# Patient Record
Sex: FEMALE | Race: WHITE | NOT HISPANIC OR LATINO | Employment: OTHER | URBAN - METROPOLITAN AREA
[De-identification: names, ages, dates, MRNs, and addresses within clinical notes are randomized per-mention and may not be internally consistent; named-entity substitution may affect disease eponyms.]

---

## 2017-03-27 ENCOUNTER — ALLSCRIPTS OFFICE VISIT (OUTPATIENT)
Dept: OTHER | Facility: OTHER | Age: 62
End: 2017-03-27

## 2017-05-02 ENCOUNTER — CONVERSION ENCOUNTER (OUTPATIENT)
Dept: MAMMOGRAPHY | Facility: CLINIC | Age: 62
End: 2017-05-02

## 2017-05-02 ENCOUNTER — GENERIC CONVERSION - ENCOUNTER (OUTPATIENT)
Dept: OTHER | Facility: OTHER | Age: 62
End: 2017-05-02

## 2017-09-07 ENCOUNTER — ALLSCRIPTS OFFICE VISIT (OUTPATIENT)
Dept: OTHER | Facility: OTHER | Age: 62
End: 2017-09-07

## 2017-10-20 ENCOUNTER — GENERIC CONVERSION - ENCOUNTER (OUTPATIENT)
Dept: OTHER | Facility: OTHER | Age: 62
End: 2017-10-20

## 2018-01-14 VITALS
SYSTOLIC BLOOD PRESSURE: 134 MMHG | HEIGHT: 65 IN | HEART RATE: 80 BPM | BODY MASS INDEX: 23.82 KG/M2 | WEIGHT: 143 LBS | DIASTOLIC BLOOD PRESSURE: 88 MMHG | TEMPERATURE: 97.7 F | RESPIRATION RATE: 16 BRPM

## 2018-01-14 NOTE — MISCELLANEOUS
Message  called and lef t message for pt  to call the office or my cell # [phone] referable to 08/02/16 screening mammogram at [facility] [place] [facility] (will need additional studies, but report did not mention which studies to be done)      Signatures   Electronically signed by : Mag Givens DO; Aug  3 2016  9:56AM EST                       (Author)

## 2018-01-14 NOTE — MISCELLANEOUS
Message  pt  returned my call and I explained the call; she will contact [facility] imaging to let us know what f/u Rxs needed and I advisedher to co tact our office and ask for Danilo Ayala and we can send or fax Rxs      Signatures   Electronically signed by : Elvia Last DO; Aug  3 2016 12:15PM EST                       (Author)

## 2018-03-07 DIAGNOSIS — E03.9 HYPOTHYROIDISM, UNSPECIFIED TYPE: Primary | ICD-10-CM

## 2018-03-07 RX ORDER — LEVOTHYROXINE SODIUM 50 MCG
TABLET ORAL
Qty: 90 TABLET | Refills: 2 | Status: SHIPPED | OUTPATIENT
Start: 2018-03-07 | End: 2018-12-03 | Stop reason: SDUPTHER

## 2018-03-12 DIAGNOSIS — M06.9 RHEUMATOID ARTHRITIS, INVOLVING UNSPECIFIED SITE, UNSPECIFIED RHEUMATOID FACTOR PRESENCE: Primary | ICD-10-CM

## 2018-03-12 RX ORDER — CYCLOBENZAPRINE HCL 10 MG
TABLET ORAL
Qty: 90 TABLET | Refills: 1 | Status: SHIPPED | OUTPATIENT
Start: 2018-03-12 | End: 2018-09-18 | Stop reason: SDUPTHER

## 2018-03-26 ENCOUNTER — OFFICE VISIT (OUTPATIENT)
Dept: FAMILY MEDICINE CLINIC | Facility: CLINIC | Age: 63
End: 2018-03-26
Payer: COMMERCIAL

## 2018-03-26 VITALS
SYSTOLIC BLOOD PRESSURE: 130 MMHG | HEART RATE: 80 BPM | WEIGHT: 140 LBS | TEMPERATURE: 97.6 F | HEIGHT: 64 IN | RESPIRATION RATE: 16 BRPM | DIASTOLIC BLOOD PRESSURE: 80 MMHG | BODY MASS INDEX: 23.9 KG/M2

## 2018-03-26 DIAGNOSIS — J30.9 ALLERGIC RHINITIS, UNSPECIFIED CHRONICITY, UNSPECIFIED SEASONALITY, UNSPECIFIED TRIGGER: ICD-10-CM

## 2018-03-26 DIAGNOSIS — E55.9 VITAMIN D DEFICIENCY: ICD-10-CM

## 2018-03-26 DIAGNOSIS — M06.9 RHEUMATOID ARTHRITIS INVOLVING MULTIPLE SITES, UNSPECIFIED RHEUMATOID FACTOR PRESENCE: ICD-10-CM

## 2018-03-26 DIAGNOSIS — E03.9 HYPOTHYROIDISM, UNSPECIFIED TYPE: Primary | ICD-10-CM

## 2018-03-26 PROCEDURE — 99214 OFFICE O/P EST MOD 30 MIN: CPT | Performed by: FAMILY MEDICINE

## 2018-03-26 RX ORDER — CYCLOBENZAPRINE HCL 10 MG
1 TABLET ORAL
COMMUNITY
Start: 2012-11-19 | End: 2018-05-16

## 2018-03-26 RX ORDER — MULTIVIT WITH MINERALS/LUTEIN
1000 TABLET ORAL DAILY
COMMUNITY

## 2018-03-26 RX ORDER — METHOTREXATE 25 MG/ML
INJECTION, SOLUTION INTRA-ARTERIAL; INTRAMUSCULAR; INTRAVENOUS
COMMUNITY
Start: 2010-12-24

## 2018-03-26 RX ORDER — COVID-19 ANTIGEN TEST
KIT MISCELLANEOUS AS NEEDED
COMMUNITY
End: 2022-07-05

## 2018-03-26 RX ORDER — CYCLOSPORINE 0.5 MG/ML
1 EMULSION OPHTHALMIC
COMMUNITY

## 2018-03-26 RX ORDER — HYDROCHLOROTHIAZIDE 25 MG/1
1 TABLET ORAL DAILY PRN
COMMUNITY
Start: 2016-08-23

## 2018-03-26 RX ORDER — FOLIC ACID 1 MG/1
1 TABLET ORAL DAILY
COMMUNITY
Start: 2013-06-29

## 2018-03-26 RX ORDER — OXYCODONE HYDROCHLORIDE AND ACETAMINOPHEN 5; 325 MG/1; MG/1
1 TABLET ORAL EVERY 4 HOURS PRN
COMMUNITY
Start: 2012-03-08

## 2018-03-26 RX ORDER — LEVOTHYROXINE SODIUM 0.05 MG/1
1 TABLET ORAL DAILY
COMMUNITY
Start: 2012-10-16 | End: 2018-05-16

## 2018-03-26 RX ORDER — GREEN TEA/HOODIA GORDONII 315-12.5MG
1 CAPSULE ORAL DAILY
COMMUNITY

## 2018-03-26 NOTE — PATIENT INSTRUCTIONS
Continue present treatment  Try Flonase nasal spray 2 sprays per nostril daily and increase fluids  Low-fat diet and regular exercise walking as tolerated  Follow up with specialists as scheduled and return to the office in 1 year

## 2018-03-26 NOTE — PROGRESS NOTES
Assessment/Plan:  Labs drawn for lipid profile, TSH and vitamin-D  Patient to continue present treatment and recommend she try Flonase 2 sprays each nostril daily and increase fluids  Follow a low-fat diet and get regular exercise walking as tolerated  Follow up with specialists as scheduled and return to the office in 1 year  No problem-specific Assessment & Plan notes found for this encounter  Diagnoses and all orders for this visit:    Hypothyroidism, unspecified type  Comments:  Clinically stable  Labs drawn for TSH  Continue present treatment  Orders:  -     Lipid panel  -     TSH, 3rd generation with T4 reflex    Vitamin D deficiency  Comments:  Continue vitamin-D supplement  Orders:  -     Vitamin D 25 hydroxy    Allergic rhinitis, unspecified chronicity, unspecified seasonality, unspecified trigger  Comments:  Trial Flonase 2 sprays each nostril daily  Rheumatoid arthritis involving multiple sites, unspecified rheumatoid factor presence (HCC)  Comments:  Stable  Continue present treatment and follow up with Rheumatology as scheduled  Other orders  -     Naproxen Sodium (ALEVE) 220 MG CAPS; Take by mouth  -     oxyCODONE-acetaminophen (PERCOCET) 5-325 mg per tablet; Take by mouth  -     hydrochlorothiazide (HYDRODIURIL) 25 mg tablet; Take 1 tablet by mouth daily as needed  -     folic acid (FOLVITE) 1 mg tablet; Take 1 tablet by mouth daily  -     cyclobenzaprine (FLEXERIL) 10 mg tablet; Take 1 tablet by mouth  -     cycloSPORINE (RESTASIS) 0 05 % ophthalmic emulsion; Apply 1 drop to eye  -     levothyroxine (SYNTHROID) 50 mcg tablet; Take 1 tablet by mouth daily  -     Ascorbic Acid (VITAMIN C) 1000 MG tablet; Take 1,000 mg by mouth daily  -     Cholecalciferol 2000 units CAPS; Take 1 capsule by mouth  -     Multiple Vitamins-Minerals (HAIR/SKIN/NAILS/BIOTIN) TABS; Take 1 tablet by mouth daily  -     methotrexate 25 MG/ML;  Inject as directed          Subjective:      Patient ID: Adrianna Suarezjeremy Alaina Barraza is a 60 y o  female  Patient is here for routine appointment for chronic conditions and fasting labs  Patient has been feeling fairly well overall  Patient was recently in [place] and a lot of walking  Patient saw her rheumatologist last week and was treated with a Z-Bang for sinus infection  Patient had labs for CBC, CMP, CRP and sed rate 2 weeks ago per Rheumatology  Thyroid Problem   Presents for follow-up visit  Symptoms include cold intolerance, dry skin, leg swelling and nail problem  Patient reports no anxiety, constipation, depressed mood, diaphoresis, diarrhea, fatigue, hair loss, heat intolerance, hoarse voice, palpitations, tremors, visual change, weight gain or weight loss  The symptoms have been stable  The following portions of the patient's history were reviewed and updated as appropriate: allergies, current medications, past family history, past medical history, past social history, past surgical history and problem list     Review of Systems   Constitutional: Negative for activity change, appetite change, diaphoresis, fatigue, unexpected weight change, weight gain and weight loss  HENT: Positive for congestion, ear pain, postnasal drip and sinus pressure  Negative for ear discharge, hearing loss, hoarse voice, nosebleeds, sinus pain, sneezing, sore throat and tinnitus  Eyes: Negative  Respiratory: Negative for cough, chest tightness, shortness of breath and wheezing  Cardiovascular: Positive for leg swelling  Negative for chest pain and palpitations  Gastrointestinal: Negative for abdominal pain, blood in stool, constipation, diarrhea and nausea  Endocrine: Positive for cold intolerance  Negative for heat intolerance  Genitourinary: Negative for difficulty urinating, dysuria, frequency, hematuria and urgency  Musculoskeletal: Positive for arthralgias, back pain, joint swelling, myalgias, neck pain and neck stiffness  Negative for gait problem     Skin: Negative  Neurological: Negative for dizziness, tremors, syncope, light-headedness and headaches  Hematological: Negative for adenopathy  Does not bruise/bleed easily  Psychiatric/Behavioral: The patient is not nervous/anxious  Objective:      /80   Pulse 80   Temp 97 6 °F (36 4 °C) (Tympanic)   Resp 16   Ht 5' 4" (1 626 m)   Wt 63 5 kg (140 lb)   BMI 24 03 kg/m²          Physical Exam   Constitutional: She is oriented to person, place, and time  She appears well-developed and well-nourished  HENT:   Head: Normocephalic  Right Ear: External ear normal    Left Ear: External ear normal    Mouth/Throat: Oropharynx is clear and moist    Positive turbinates swelling with mucoid drainage  Eyes: Conjunctivae are normal  No scleral icterus  Neck: Neck supple  No thyromegaly present  Cardiovascular: Normal rate and regular rhythm  Pulmonary/Chest: Effort normal and breath sounds normal    Abdominal: Soft  There is no tenderness  Musculoskeletal: She exhibits no edema  Lymphadenopathy:     She has no cervical adenopathy  Neurological: She is alert and oriented to person, place, and time  Skin: Skin is warm and dry  Psychiatric: She has a normal mood and affect

## 2018-05-16 ENCOUNTER — OFFICE VISIT (OUTPATIENT)
Dept: FAMILY MEDICINE CLINIC | Facility: CLINIC | Age: 63
End: 2018-05-16
Payer: COMMERCIAL

## 2018-05-16 VITALS
HEART RATE: 84 BPM | DIASTOLIC BLOOD PRESSURE: 78 MMHG | TEMPERATURE: 98.2 F | WEIGHT: 139 LBS | SYSTOLIC BLOOD PRESSURE: 130 MMHG | HEIGHT: 64 IN | RESPIRATION RATE: 16 BRPM | BODY MASS INDEX: 23.73 KG/M2

## 2018-05-16 DIAGNOSIS — G56.01 CARPAL TUNNEL SYNDROME OF RIGHT WRIST: Primary | ICD-10-CM

## 2018-05-16 PROCEDURE — 99213 OFFICE O/P EST LOW 20 MIN: CPT | Performed by: FAMILY MEDICINE

## 2018-05-16 PROCEDURE — 3008F BODY MASS INDEX DOCD: CPT | Performed by: FAMILY MEDICINE

## 2018-05-16 RX ORDER — FLUTICASONE PROPIONATE 50 MCG
1 SPRAY, SUSPENSION (ML) NASAL AS NEEDED
COMMUNITY
End: 2020-05-29

## 2018-05-16 RX ORDER — METHYLPREDNISOLONE 4 MG/1
TABLET ORAL
Qty: 21 TABLET | Refills: 0 | Status: SHIPPED | OUTPATIENT
Start: 2018-05-16 | End: 2018-11-26 | Stop reason: ALTCHOICE

## 2018-05-16 RX ORDER — SODIUM SULFACETAMIDE AND SULFUR 80; 40 MG/ML; MG/ML
LOTION TOPICAL
Refills: 3 | COMMUNITY
Start: 2018-04-05 | End: 2020-06-12

## 2018-05-16 NOTE — PROGRESS NOTES
Assessment/Plan:    Discussed diagnostic and treatment options with patient  Discussed EMG testing although will hold off at this time as symptoms of bone been present for few weeks  Patient will be started on a Medrol Dosepak and instructed to hold Aleve while taking Medrol Dosepak  Patient instructed to wear right cock-up wrist splint q h s  and p r n  during the day  Patient may apply ice p r n  Recommend rest and avoid overuse  Return to the office in 2-3 weeks or sooner p r n   If symptoms persist discussed EMG testing and referral to orthopedic hand specialist      Diagnoses and all orders for this visit:    Carpal tunnel syndrome of right wrist  Comments:  Medrol Dosepak  Right cock-up wrist splint q h s  and p r n  during the day  Rest and ice p r n   Orders:  -     Methylprednisolone 4 MG TBPK; Use as directed on package    Other orders  -     SULFACLEANSE 8/4 8-4 % SUSP; [address] FACE WITH CLEANSER DAILY  -     fluticasone (FLONASE) 50 mcg/act nasal spray; 1 spray into each nostril daily          Subjective:      Patient ID: Bogdan Coles is a 60 y o  female  Patient complains of right hand numbness and tingling involving her thumb index and middle finger for the past 2-3 weeks intermittently  She admits to slight pain in the right hand and symptoms awakening her at night from sleep  She admits to some weakness and occasionally dropping things  She also admits to occasional numbness and tingling of her right 4th and 5th fingers while driving in her car resting her right arm on a console  Patient denies any specific injury or fall  She does admit to arthritis in her neck  Patient admits to doing a lot of painting over the past few weeks prior to the onset of her symptoms  She has treated this with Aleve without significant relief  Hand Pain    The incident occurred more than 1 week ago  The injury mechanism was repetitive motion  The pain is present in the right hand   The quality of the pain is described as burning  The pain does not radiate  The pain is mild  Associated symptoms include muscle weakness, numbness and tingling  She has tried NSAIDs for the symptoms  The treatment provided mild relief  The following portions of the patient's history were reviewed and updated as appropriate: allergies, current medications, past family history, past medical history, past social history, past surgical history and problem list     Review of Systems   Neurological: Positive for tingling and numbness  Objective:      /78   Pulse 84   Temp 98 2 °F (36 8 °C) (Oral)   Resp 16   Ht 5' 4" (1 626 m)   Wt 63 kg (139 lb)   BMI 23 86 kg/m²          Physical Exam   Constitutional: She is oriented to person, place, and time  She appears well-developed and well-nourished  HENT:   Head: Normocephalic  Mouth/Throat: Oropharynx is clear and moist    Eyes: Conjunctivae are normal    Neck: Normal range of motion  Neck supple  Cardiovascular: Normal rate and regular rhythm  Pulmonary/Chest: Effort normal and breath sounds normal    Abdominal: Soft  There is no tenderness  Musculoskeletal: She exhibits tenderness  She exhibits no edema  Neck range of motion intact with mild tenderness  Right shoulder and right elbow full range of motion intact and nontender  Right wrist range of motion intact with mild tenderness  Positive Tinel's and Phalen's at the right wrist   Negative thenar atrophy and  strength intact  Mild Tinel's at the right elbow  Lymphadenopathy:     She has no cervical adenopathy  Neurological: She is alert and oriented to person, place, and time  Skin: Skin is warm and dry  Psychiatric: She has a normal mood and affect

## 2018-05-18 ENCOUNTER — ANNUAL EXAM (OUTPATIENT)
Dept: OBGYN CLINIC | Facility: MEDICAL CENTER | Age: 63
End: 2018-05-18
Payer: COMMERCIAL

## 2018-05-18 VITALS
SYSTOLIC BLOOD PRESSURE: 150 MMHG | HEIGHT: 65 IN | WEIGHT: 139 LBS | DIASTOLIC BLOOD PRESSURE: 80 MMHG | BODY MASS INDEX: 23.16 KG/M2

## 2018-05-18 DIAGNOSIS — Z01.419 ENCNTR FOR GYN EXAM (GENERAL) (ROUTINE) W/O ABN FINDINGS: Primary | ICD-10-CM

## 2018-05-18 DIAGNOSIS — Z12.31 ENCOUNTER FOR SCREENING MAMMOGRAM FOR MALIGNANT NEOPLASM OF BREAST: ICD-10-CM

## 2018-05-18 PROCEDURE — S0612 ANNUAL GYNECOLOGICAL EXAMINA: HCPCS | Performed by: OBSTETRICS & GYNECOLOGY

## 2018-05-18 NOTE — PROGRESS NOTES
ASSESSMENT & PLAN: Micheal Schafer was seen today for gynecologic exam     Diagnoses and all orders for this visit:    Encntr for gyn exam (general) (routine) w/o abn findings    Encounter for screening mammogram for malignant neoplasm of breast  -     Mammo screening bilateral w cad; Future    Discussion/Summary: reviewed potential gyn emergencies; states her daughter is living in [place], [place]  And working for [employer]; personally, having no health issues and currently, stopped using Enbrel     1  Routine well woman exam done today  2  Pap and HPV:  The patient's pap is up to date  Pap and cotesting was not done today  Current ASCCP Guidelines reviewed  3   Mammogram was ordered  4  Colonoscopy is up to date  4  The following were reviewed in today's visit: breast self exam   5  F/u 1 year      CC:  Annual Gynecologic Examination    HPI: Agapito Lovell is a 60 y o  who presents for annual gynecologic examination  She has the following concerns:  none    Health Maintenance:    Patient describes her health as good  Patient does not have weight concerns  She exercises 7 days per week with housework and walking  She doeswears her seatbelt routinely  She does perform regular monthly self breast exams  She does feel safe at home  Patients does not follow a  diet  Patient gets 4 servings of dairy or calcium rich foods a day      Last pap: 2016  Last mammogram: 2017  Last colonoscopy: 2015    Patient Active Problem List   Diagnosis    Abnormal finding on mammography    Allergic rhinitis    Hypothyroidism    Rheumatoid arthritis ([address] )    Rosacea    Unsatisfactory cervical Papanicolaou smear    Vitamin D deficiency       Past Medical History:   Diagnosis Date    Thrombophlebitis     deep vessels of the lower extremity       Past Surgical History:   Procedure Laterality Date    CYST REMOVAL Left     excision - Baker's cyst - knee     REPLACEMENT TOTAL KNEE Left     x2    TOTAL KNEE ARTHROPLASTY Right     revision - resolved 10/2010    TUBAL LIGATION         Past OB/Gyn History:    History of abnormal pap smears: No    Patient is not currently sexually active  monogamous   Patient's family planning method is tubal ligation  Family History   Problem Relation Age of Onset    Diabetes Mother     Hypertension Mother     Hypothyroidism Mother     Coronary artery disease Father        Social History:  Social History     Social History    Marital status: /Civil Union     Spouse name: N/A    Number of children: N/A    Years of education: N/A     Occupational History    Not on file  Social History Main Topics    Smoking status: Former Smoker     Packs/day: 1 00     Years: 10 00    Smokeless tobacco: Never Used      Comment: negativ tob x21 yrs- previously less [id number]    Alcohol use Yes      Comment: socially    Drug use: No    Sexual activity: Not on file     Other Topics Concern    Not on file     Social History Narrative    No narrative on file     Presently lives with family  Patient is currently unemployed   Allergies   Allergen Reactions    Cephalexin      Reaction Date: [id number];   Reaction Date: 13Apr2011;     Erythromycin      Reaction Date: 13Apr2011;    Other reaction(s): Unknown Reaction    Nuts Hives    Ciprofloxacin Rash    Penicillins Rash         Current Outpatient Prescriptions:     Ascorbic Acid (VITAMIN C) 1000 MG tablet, Take 1,000 mg by mouth daily, Disp: , Rfl:     Cholecalciferol 2000 units CAPS, Take 1 capsule by mouth, Disp: , Rfl:     cyclobenzaprine (FLEXERIL) 10 mg tablet, TAKE 1 TABLET AT BEDTIME, Disp: 90 tablet, Rfl: 1    cycloSPORINE (RESTASIS) 0 05 % ophthalmic emulsion, Apply 1 drop to eye, Disp: , Rfl:     fluticasone (FLONASE) 50 mcg/act nasal spray, 1 spray into each nostril daily, Disp: , Rfl:     folic acid (FOLVITE) 1 mg tablet, Take 1 tablet by mouth daily, Disp: , Rfl:     hydrochlorothiazide (HYDRODIURIL) 25 mg tablet, Take 1 tablet by mouth daily as needed, Disp: , Rfl:     methotrexate 25 MG/ML, Inject as directed, Disp: , Rfl:     Methylprednisolone 4 MG TBPK, Use as directed on package, Disp: 21 tablet, Rfl: 0    Multiple Vitamins-Minerals (HAIR/SKIN/NAILS/BIOTIN) TABS, Take 1 tablet by mouth daily, Disp: , Rfl:     Naproxen Sodium (ALEVE) 220 MG CAPS, Take by mouth, Disp: , Rfl:     oxyCODONE-acetaminophen (PERCOCET) 5-325 mg per tablet, Take by mouth, Disp: , Rfl:     SULFACLEANSE 8/4 8-4 % SUSP, WASH FACE WITH CLEANSER DAILY, Disp: , Rfl: 3    SYNTHROID 50 MCG tablet, TAKE 1 TABLET DAILY, Disp: 90 tablet, Rfl: 2    Review of Systems  Constitutional :no fever, feels well, no tiredness, no recent weight gain or loss  ENT: no ear ache, no loss of hearing, no nosebleeds or nasal discharge, no sore throat or hoarseness  Cardiovascular: no complaints of slow or fast heart beat, no chest pain, no palpitations, no leg claudication or lower extremity edema  Respiratory: no complaints of shortness of shortness of breath, no CORTEZ  Breasts:no complaints of breast pain, breast lump, or nipple discharge  Gastrointestinal: no complaints of abdominal pain, constipation, nausea, vomiting, or diarrhea or bloody stools  Genitourinary : no complaints of dysuria, incontinence, pelvic pain, no dysmenorrhea, vaginal discharge or abnormal vaginal bleeding and as noted in HPI  Musculoskeletal: no complaints of arthralgia, no myalgia, no joint swelling or stiffness, no limb pain or swelling  Integumentary: no complaints of skin rash or lesion, itching or dry skin  Neurological: no complaints of headache, no confusion, no numbness or tingling, no dizziness or fainting    Physical Exam:   /80   Ht 5' 4 5" (1 638 m)   Wt 63 kg (139 lb)   LMP 05/18/2006   Breastfeeding? No   BMI 23 49 kg/m²     General:   appears stated age, cooperative, alert normal mood and affect   Psychiatric oriented to person, place and time    Mood and affect normal   Neck: normal, supple,trachea midline, no masses  Thyroid: normal, no thyromegaly   Heart: regular rate and rhythm, S1, S2 normal, no murmur, click, rub or gallop   Lungs: clear to auscultation bilaterally, no increased work of breathing or signs of respiratory distress   Breasts: normal, no dimpling or skin changes noted   Abdomen: soft, non-tender, without masses or organomegaly   Vulva: normal , no lesions   Vagina: normal , no lesions or dryness   Urethra: normal   Urethal meatus normal   Bladder Normal, soft, non-tender and no prolapse or masses appreciated   Cervix: normal, no palpable masses    Uterus: normal , non-tender, not enlarged, no palpable masses   Adnexa: normal, non-tender without fullness or masses;hemoccult neg  Lymphatic Palpation of lymph nodes in neck, axilla, groin and/or other locations: no lymphadenopathy or masses noted   Skin Normal skin turgor and no rashes    Palpation of skin and subcutaneous tissue normal

## 2018-06-11 ENCOUNTER — CONVERSION ENCOUNTER (OUTPATIENT)
Dept: MAMMOGRAPHY | Facility: CLINIC | Age: 63
End: 2018-06-11

## 2018-09-18 DIAGNOSIS — M06.9 RHEUMATOID ARTHRITIS, INVOLVING UNSPECIFIED SITE, UNSPECIFIED RHEUMATOID FACTOR PRESENCE: ICD-10-CM

## 2018-09-18 RX ORDER — CYCLOBENZAPRINE HCL 10 MG
TABLET ORAL
Qty: 90 TABLET | Refills: 1 | Status: SHIPPED | OUTPATIENT
Start: 2018-09-18 | End: 2019-02-04 | Stop reason: SDUPTHER

## 2018-10-02 RX ORDER — METHOTREXATE 25 MG/ML
INJECTION, SOLUTION INTRA-ARTERIAL; INTRAMUSCULAR; INTRAVENOUS
COMMUNITY
Start: 2018-08-07 | End: 2018-10-29

## 2018-10-04 ENCOUNTER — OFFICE VISIT (OUTPATIENT)
Dept: FAMILY MEDICINE CLINIC | Facility: CLINIC | Age: 63
End: 2018-10-04
Payer: COMMERCIAL

## 2018-10-04 VITALS
TEMPERATURE: 97.2 F | WEIGHT: 136 LBS | HEART RATE: 80 BPM | HEIGHT: 65 IN | SYSTOLIC BLOOD PRESSURE: 136 MMHG | OXYGEN SATURATION: 98 % | BODY MASS INDEX: 22.66 KG/M2 | RESPIRATION RATE: 16 BRPM | DIASTOLIC BLOOD PRESSURE: 72 MMHG

## 2018-10-04 DIAGNOSIS — M06.9 RHEUMATOID ARTHRITIS INVOLVING MULTIPLE SITES, UNSPECIFIED RHEUMATOID FACTOR PRESENCE: ICD-10-CM

## 2018-10-04 DIAGNOSIS — Z01.818 PREOP EXAMINATION: Primary | ICD-10-CM

## 2018-10-04 DIAGNOSIS — E03.9 HYPOTHYROIDISM, UNSPECIFIED TYPE: ICD-10-CM

## 2018-10-04 DIAGNOSIS — R22.42 MASS OF LEFT FOOT: ICD-10-CM

## 2018-10-04 LAB
ERYTHROCYTE [DISTWIDTH] IN BLOOD BY AUTOMATED COUNT: 15.4 % (ref 11.6–15.1)
HCT VFR BLD AUTO: 36.2 % (ref 34.8–46.1)
HGB BLD-MCNC: 11.3 G/DL (ref 11.5–15.4)
MCH RBC QN AUTO: 29 PG (ref 26.8–34.3)
MCHC RBC AUTO-ENTMCNC: 31.2 G/DL (ref 31.4–37.4)
MCV RBC AUTO: 93 FL (ref 82–98)
PLATELET # BLD AUTO: 380 THOUSANDS/UL (ref 149–390)
PMV BLD AUTO: 10.8 FL (ref 8.9–12.7)
RBC # BLD AUTO: 3.9 MILLION/UL (ref 3.81–5.12)
WBC # BLD AUTO: 7.66 THOUSAND/UL (ref 4.31–10.16)

## 2018-10-04 PROCEDURE — 93000 ELECTROCARDIOGRAM COMPLETE: CPT | Performed by: FAMILY MEDICINE

## 2018-10-04 PROCEDURE — 36415 COLL VENOUS BLD VENIPUNCTURE: CPT | Performed by: FAMILY MEDICINE

## 2018-10-04 PROCEDURE — 85027 COMPLETE CBC AUTOMATED: CPT | Performed by: FAMILY MEDICINE

## 2018-10-04 PROCEDURE — 99243 OFF/OP CNSLTJ NEW/EST LOW 30: CPT | Performed by: FAMILY MEDICINE

## 2018-10-04 NOTE — PROGRESS NOTES
Assessment/Plan:    EKG is normal   Labs drawn for CBC  Form completed for preoperative H&P and medical clearance  Diagnoses and all orders for this visit:    Preop examination  -     Cancel: CBC and Platelet; Future  -     POCT ECG  -     CBC and Platelet    Mass of left foot    Hypothyroidism, unspecified type    Rheumatoid arthritis involving multiple sites, unspecified rheumatoid factor presence (HCC)    Other orders  -     methotrexate 50 MG/2ML injection;           Subjective:      Patient ID: Sarah Lr is a 60 y o  female  Patient is here for preoperative H&P and medical clearance exam for excision of soft tissue of the left foot scheduled on 10/30/2018 at [facility] with Dr Elinor Vilchis, podiatrist   She requests CBC and EKG  Patient denies bleeding problems or problems with anesthesia  The following portions of the patient's history were reviewed and updated as appropriate: allergies, current medications, past family history, past medical history, past social history, past surgical history and problem list     Review of Systems   Constitutional: Negative for activity change, appetite change, fatigue and unexpected weight change  HENT: Positive for congestion, postnasal drip and sneezing  Negative for ear pain, nosebleeds, rhinorrhea, sinus pain, sinus pressure and sore throat  Eyes: Negative  Respiratory: Negative for cough, chest tightness, shortness of breath and wheezing  Cardiovascular: Negative for chest pain, palpitations and leg swelling  Gastrointestinal: Negative for abdominal pain, blood in stool, constipation, diarrhea, nausea and vomiting  Endocrine: Negative for cold intolerance and heat intolerance  Genitourinary: Negative for difficulty urinating, dysuria, frequency, hematuria and urgency  Musculoskeletal: Positive for arthralgias, back pain, joint swelling, myalgias, neck pain and neck stiffness  Negative for gait problem  Skin: Negative  Neurological: Negative for dizziness, syncope, weakness, light-headedness, numbness and headaches  Hematological: Negative for adenopathy  Does not bruise/bleed easily  Psychiatric/Behavioral: Negative for dysphoric mood and sleep disturbance  The patient is not nervous/anxious  Objective:      /72   Pulse 80   Temp (!) 97 2 °F (36 2 °C)   Resp 16   Ht 5' 4 96" (1 65 m)   Wt 61 7 kg (136 lb)   LMP 05/18/2006   SpO2 98%   BMI 22 66 kg/m²          Physical Exam   Constitutional: She is oriented to person, place, and time  She appears well-developed and well-nourished  HENT:   Head: Normocephalic  Right Ear: External ear normal    Left Ear: External ear normal    Nose: Nose normal    Mouth/Throat: Oropharynx is clear and moist    Eyes: Conjunctivae are normal  No scleral icterus  Neck: Neck supple  Cardiovascular: Normal rate and regular rhythm  Pulmonary/Chest: Effort normal and breath sounds normal    Abdominal: Soft  There is no tenderness  Musculoskeletal: She exhibits no edema  Lymphadenopathy:     She has no cervical adenopathy  Neurological: She is alert and oriented to person, place, and time  Skin: Skin is warm and dry  Psychiatric: She has a normal mood and affect

## 2018-10-29 ENCOUNTER — ANESTHESIA EVENT (OUTPATIENT)
Dept: PERIOP | Facility: HOSPITAL | Age: 63
End: 2018-10-29
Payer: COMMERCIAL

## 2018-10-29 NOTE — PRE-PROCEDURE INSTRUCTIONS
Pre-Surgery Instructions:   Medication Instructions    Ascorbic Acid (VITAMIN C) 1000 MG tablet Instructed patient per Anesthesia Guidelines   Cholecalciferol 2000 units CAPS Instructed patient per Anesthesia Guidelines   cyclobenzaprine (FLEXERIL) 10 mg tablet Instructed patient per Anesthesia Guidelines   cycloSPORINE (RESTASIS) 0 05 % ophthalmic emulsion Instructed patient per Anesthesia Guidelines   fluticasone (FLONASE) 50 mcg/act nasal spray Instructed patient per Anesthesia Guidelines   folic acid (FOLVITE) 1 mg tablet Instructed patient per Anesthesia Guidelines   hydrochlorothiazide (HYDRODIURIL) 25 mg tablet Instructed patient per Anesthesia Guidelines   methotrexate 25 MG/ML Instructed patient per Anesthesia Guidelines   Multiple Vitamins-Minerals (HAIR/SKIN/NAILS/BIOTIN) TABS Instructed patient per Anesthesia Guidelines   Naproxen Sodium (ALEVE) 220 MG CAPS Instructed patient per Anesthesia Guidelines   oxyCODONE-acetaminophen (PERCOCET) 5-325 mg per tablet Instructed patient per Anesthesia Guidelines   SULFACLEANSE 8/4 8-4 % SUSP Instructed patient per Anesthesia Guidelines   SYNTHROID 50 MCG tablet Instructed patient per Anesthesia Guidelines  Per anesthesia patient was instructed that she may take her Synthroid with sip of water on day of surgery and was instructed on use of Chlorhexidine for preoperative bathing  She has followed her surgeon's instructions regarding stopping any supplements/medications prior to surgery

## 2018-10-30 ENCOUNTER — HOSPITAL ENCOUNTER (OUTPATIENT)
Facility: HOSPITAL | Age: 63
Setting detail: OUTPATIENT SURGERY
Discharge: HOME | End: 2018-10-30
Attending: PODIATRIST | Admitting: PODIATRIST
Payer: COMMERCIAL

## 2018-10-30 ENCOUNTER — ANESTHESIA (OUTPATIENT)
Dept: PERIOP | Facility: HOSPITAL | Age: 63
End: 2018-10-30
Payer: COMMERCIAL

## 2018-10-30 VITALS
BODY MASS INDEX: 23.05 KG/M2 | DIASTOLIC BLOOD PRESSURE: 65 MMHG | SYSTOLIC BLOOD PRESSURE: 132 MMHG | RESPIRATION RATE: 17 BRPM | OXYGEN SATURATION: 96 % | TEMPERATURE: 98.2 F | HEART RATE: 90 BPM | WEIGHT: 135 LBS | HEIGHT: 64 IN

## 2018-10-30 DIAGNOSIS — M79.89 SOFT TISSUE MASS: ICD-10-CM

## 2018-10-30 PROCEDURE — 88307 TISSUE EXAM BY PATHOLOGIST: CPT | Performed by: PATHOLOGY

## 2018-10-30 RX ORDER — CLINDAMYCIN PHOSPHATE 900 MG/50ML
900 INJECTION INTRAVENOUS
Status: DISCONTINUED | OUTPATIENT
Start: 2018-10-30 | End: 2018-10-30 | Stop reason: HOSPADM

## 2018-10-30 RX ORDER — KETOROLAC TROMETHAMINE 30 MG/ML
INJECTION, SOLUTION INTRAMUSCULAR; INTRAVENOUS AS NEEDED
Status: DISCONTINUED | OUTPATIENT
Start: 2018-10-30 | End: 2018-10-30 | Stop reason: SURG

## 2018-10-30 RX ORDER — FENTANYL CITRATE 50 UG/ML
INJECTION, SOLUTION INTRAMUSCULAR; INTRAVENOUS AS NEEDED
Status: DISCONTINUED | OUTPATIENT
Start: 2018-10-30 | End: 2018-10-30 | Stop reason: SURG

## 2018-10-30 RX ORDER — DEXAMETHASONE SODIUM PHOSPHATE 4 MG/ML
INJECTION, SOLUTION INTRA-ARTICULAR; INTRALESIONAL; INTRAMUSCULAR; INTRAVENOUS; SOFT TISSUE AS NEEDED
Status: DISCONTINUED | OUTPATIENT
Start: 2018-10-30 | End: 2018-10-30 | Stop reason: HOSPADM

## 2018-10-30 RX ORDER — MIDAZOLAM HYDROCHLORIDE 1 MG/ML
INJECTION INTRAMUSCULAR; INTRAVENOUS AS NEEDED
Status: DISCONTINUED | OUTPATIENT
Start: 2018-10-30 | End: 2018-10-30 | Stop reason: SURG

## 2018-10-30 RX ORDER — SODIUM CHLORIDE 9 MG/ML
125 INJECTION, SOLUTION INTRAVENOUS CONTINUOUS
Status: DISCONTINUED | OUTPATIENT
Start: 2018-10-30 | End: 2018-10-30 | Stop reason: HOSPADM

## 2018-10-30 RX ORDER — CLINDAMYCIN PHOSPHATE 150 MG/ML
INJECTION, SOLUTION INTRAVENOUS AS NEEDED
Status: DISCONTINUED | OUTPATIENT
Start: 2018-10-30 | End: 2018-10-30 | Stop reason: SURG

## 2018-10-30 RX ORDER — FENTANYL CITRATE/PF 50 MCG/ML
50 SYRINGE (ML) INJECTION
Status: DISCONTINUED | OUTPATIENT
Start: 2018-10-30 | End: 2018-10-30 | Stop reason: HOSPADM

## 2018-10-30 RX ORDER — ONDANSETRON 2 MG/ML
INJECTION INTRAMUSCULAR; INTRAVENOUS AS NEEDED
Status: DISCONTINUED | OUTPATIENT
Start: 2018-10-30 | End: 2018-10-30 | Stop reason: SURG

## 2018-10-30 RX ORDER — BUPIVACAINE HYDROCHLORIDE 5 MG/ML
INJECTION, SOLUTION PERINEURAL AS NEEDED
Status: DISCONTINUED | OUTPATIENT
Start: 2018-10-30 | End: 2018-10-30 | Stop reason: HOSPADM

## 2018-10-30 RX ORDER — MAGNESIUM HYDROXIDE 1200 MG/15ML
LIQUID ORAL AS NEEDED
Status: DISCONTINUED | OUTPATIENT
Start: 2018-10-30 | End: 2018-10-30 | Stop reason: HOSPADM

## 2018-10-30 RX ORDER — OXYCODONE HYDROCHLORIDE AND ACETAMINOPHEN 5; 325 MG/1; MG/1
1 TABLET ORAL EVERY 4 HOURS PRN
Status: DISCONTINUED | OUTPATIENT
Start: 2018-10-30 | End: 2018-10-30 | Stop reason: HOSPADM

## 2018-10-30 RX ORDER — PROPOFOL 10 MG/ML
INJECTION, EMULSION INTRAVENOUS CONTINUOUS PRN
Status: DISCONTINUED | OUTPATIENT
Start: 2018-10-30 | End: 2018-10-30 | Stop reason: SURG

## 2018-10-30 RX ORDER — ONDANSETRON 2 MG/ML
4 INJECTION INTRAMUSCULAR; INTRAVENOUS ONCE AS NEEDED
Status: DISCONTINUED | OUTPATIENT
Start: 2018-10-30 | End: 2018-10-30 | Stop reason: HOSPADM

## 2018-10-30 RX ADMIN — KETOROLAC TROMETHAMINE 30 MG: 30 INJECTION, SOLUTION INTRAMUSCULAR at 08:17

## 2018-10-30 RX ADMIN — FENTANYL CITRATE 100 MCG: 50 INJECTION, SOLUTION INTRAMUSCULAR; INTRAVENOUS at 07:34

## 2018-10-30 RX ADMIN — PROPOFOL 70 MCG/KG/MIN: 10 INJECTION, EMULSION INTRAVENOUS at 07:34

## 2018-10-30 RX ADMIN — CLINDAMYCIN PHOSPHATE 900 MG: 150 INJECTION, SOLUTION INTRAMUSCULAR; INTRAVENOUS at 07:28

## 2018-10-30 RX ADMIN — MIDAZOLAM 2 MG: 1 INJECTION INTRAMUSCULAR; INTRAVENOUS at 07:20

## 2018-10-30 RX ADMIN — ONDANSETRON 4 MG: 2 INJECTION INTRAMUSCULAR; INTRAVENOUS at 08:18

## 2018-10-30 RX ADMIN — SODIUM CHLORIDE 125 ML/HR: 0.9 INJECTION, SOLUTION INTRAVENOUS at 06:16

## 2018-10-30 RX ADMIN — SODIUM CHLORIDE: 0.9 INJECTION, SOLUTION INTRAVENOUS at 07:59

## 2018-10-30 NOTE — ANESTHESIA PREPROCEDURE EVALUATION
Review of Systems/Medical History      History of anesthetic complications (iv medication helps with nausea) PONV    Cardiovascular  Exercise tolerance (METS): >4,  DVT (remote history)   Pulmonary  Smoker ex-smoker  ,        GI/Hepatic  Negative GI/hepatic ROS          Negative  ROS        Endo/Other  History of thyroid disease , hypothyroidism,      GYN  Negative gynecology ROS          Hematology  Negative hematology ROS      Musculoskeletal  Rheumatoid arthritis (last steroid use less than 1 year ago) ,   Comment: fibromyalgia Arthritis     Neurology  Negative neurology ROS      Psychology   Negative psychology ROS              Physical Exam    Airway    Mallampati score: II  TM Distance: >3 FB  Neck ROM: full     Dental       Cardiovascular  Cardiovascular exam normal    Pulmonary  Pulmonary exam normal     Other Findings        Anesthesia Plan  ASA Score- 2     Anesthesia Type- general with ASA Monitors  Additional Monitors:   Airway Plan:     Comment: tiva vs LMA  Plan Factors-    Induction- intravenous  Postoperative Plan-     Informed Consent- Anesthetic plan and risks discussed with patient

## 2018-10-30 NOTE — ANESTHESIA POSTPROCEDURE EVALUATION
Post-Op Assessment Note      CV Status:  Stable    Mental Status:  Alert and awake    Hydration Status:  Euvolemic    PONV Controlled:  Controlled    Airway Patency:  Patent    Post Op Vitals Reviewed: Yes          Staff: Anesthesiologist           /72 (10/30/18 0842)    Temp      Pulse 92 (10/30/18 0842)   Resp 19 (10/30/18 0842)    SpO2 94 % (10/30/18 0842)

## 2018-10-30 NOTE — DISCHARGE SUMMARY
Discharge Summary Outpatient Procedure Podiatry -   Florecita Arellano 60 y o  female MRN: [id number]  Unit/Bed#: OR [place] Encounter: [id number]    Admission Date: 10/30/2018     Admitting Diagnosis: Soft tissue mass [M79 9]    Discharge Diagnosis: same    Procedures Performed: FOOT SOFT TISSUE MASS EXCISION: 07016 (CPT®) - LEFT     Complications: none    Condition at Discharge: stable    Discharge instructions/Information to patient and family:   See after visit summary for information provided to patient and family  Provisions for Follow-Up Care/Important appointments:  See after visit summary for information related to follow-up care and any pertinent home health orders  Discharge Medications:  See after visit summary for reconciled discharge medications provided to patient and family

## 2018-10-30 NOTE — DISCHARGE INSTRUCTIONS
Dr Evy Hermosillo, DPM  Post-op surgery Instructions    Pain / Swelling   There is expected to be some discomfort, swelling and bruising of the foot  You might see some blood on the bandage  This is not a cause for alarm  However, if there is active or persistent bleeding (blood running out of the bandage while at rest) - call the office at once (or) go to a [facility] and ask them to page the podiatry residents   Apply an ice bag to the top of your ankle for 30 minutes for each waking hour, for the first 72 hours  This should be discontinued when sleeping  This will also work through your cast if you have one  Ice must not leak and wet the dressings  Also, using the ice inappropriately can cause permanent nerve damage   Your foot should be elevated as much as possible for the first 72 hours  The foot should be above heart level  If your foot is below heart level, throbbing and pain will increase  When sleeping, elevation can be accomplished by putting a small hard suitcase between the box spring and mattress at the foot of the bed  Walking and standing will increase pain, throbbing and bleeding   Persistent pain despite elevation and your pain meds can many times be relieved by removing the tight brown compression layer (called the ACE wrap) that is over the white gauze dressing  If you are elevating and taking your pain meds and pain is still severe, remove this brown stretchy layer but leave the gauze intact  Wait 30 minutes  If the pain subsides, reapply the ACE so its not so tight  If pain doesnt get better, call your doctor  Dressings / Casts   Do not remove your surgical dressings - they will be changed at your doctor appointment  Do not allow surgical dressings to get wet  Sponge baths should be used until the sutures are removed  Do not try to keep the foot dry using a garbage bag and tape - this rarely works    If you get your dressings or cast wet - call your doctor immediately   If your cast or dressings feel tight - elevate your foot for 30 minutes  If this doesnt help and you feel tingling or see toe discoloration - call your doctor or go to a [facility] ER and ask them to page the podiatry residents   Do not put things in your cast such as powder, coat hangers to scratch, etc  This can cause skin damage and infections  Infection   If you have a fever at or above 100 degrees, chills, sweats, or see red streaks rising above the dressing or smell odor / see pus (creamy white drainage), call your doctor immediately or go to a [facility] ER and ask them to page the podiatry residents  Constipation   If you have severe constipation after surgery, this can be due to the pain medication  Notify your doctor and special medication will be prescribed to deal with this  Blood Clots   If you had surgery and are in a cast, have an external fixation device, or are non-weightbearing using crutches, a knee scooter, a wheelchair, a walker, or an iWalk device - you need to be on a blood thinner  Your doctor will prescribe one of the regimens below  If you run out of the blood thinner checked off below before you are walking normally on your foot and out of your cast - notify your doctor immediately so you can get a refill  Not doing so can lead to blood clots and serious complications including death  Numbness   It is normal for your foot to be numb until about dinner time  If youve had a popliteal block procedure, you might be numb until the following day  When you start to feel pins and needles in the foot - this means the block is wearing off  That is the appropriate time to take your pain medication  Pain Medication   Do not supplement your pain medication with over the counter drugs, old leftover pain medications, or extra Tylenol  You must discuss any additional medications with your doctor prior to taking them for pain     Driving   No driving is allowed without discussion with the doctor  Ambulation   Heel touch to surgical foot in surgical shoe   If given a flat, stiff shoe / darco wedge shoe, Do not walk at all without it   Use a device (cane, walker, crutches) to take some weight off of the foot when walking   If instructed not to put weight on the surgical foot, use the following:  o Crutches and Rolling Walker     o Putting weight on the foot will lead to complications

## 2018-10-30 NOTE — OP NOTE
OPERATIVE REPORT  PATIENT NAME: Florecita Arellano    :  1955  MRN: [id number]  Pt Location: [facility] OR ROOM 05    SURGERY DATE: 10/30/2018    Surgeon(s) and Role:     * Magalis Camejo DPM - Primary     * Jorge Bonilla DPM - Assisting    Preop Diagnosis:  Soft tissue mass [M79 9]    Post-Op Diagnosis Codes: * Soft tissue mass [M79 9]    Procedure(s) (LRB):  FOOT SOFT TISSUE MASS EXCISION (Left)    Specimen(s):  ID Type Source Tests Collected by Time Destination   1 : SOFT TISSUE MASS LEFT FOOT Tissue Soft Tissue, Other TISSUE EXAM Magalis Camejo DPM 10/30/2018 0803        Estimated Blood Loss:   Minimal    Drains:  None     Anesthesia Type:   IV Sedation with Anesthesia  10 cc of one-to-one mix of 1% lidocaine plain and 0 5% Marcaine plain    Hemostasis:  Anatomical dissection  31 min on left pneumatic ankle tourniquet at 250 mm of mercury    Materials:  4-0 Vicryl  4-0 nylon    Injectables:  8 cc of 0 5% Marcaine plain  1 cc of dexamethasone    Operative Indications:  Soft tissue mass [M79 9]    Operative Findings:  Soft tissue mass noted to the plantar aspect of left plantar lateral foot    Complications:   None    Procedure and Technique:  Under mild sedation, the patient was brought into the operating room and placed on the operating room table in the supine position  A pneumatic ankle tourniquet was then placed around the patient's left ankle with ample webril padding  A time out was performed to confirm the correct patient, procedure and site with all parties in agreement  Following IV sedation, local anesthetic was obtained about the patient's left foot was performed consisting of 10 ml of 1% lidocaine plain and 0 5% bupivacaine plain in a 1:1 mixture  The foot was then scrubbed, prepped and draped in the usual aseptic manner  An esmarch bandage was utilized to exsangunate the patients foot and the pneumatic ankle tourniquet was then inflated   The esmarch bandage was removed and the foot was placed on the operating room table  Attention was directed to the lateral aspect of the left foot at the location of the soft tissue mass  Using a sterile 15 blade, a controlled depth skin incision was made down to the level of subcutaneous tissue  Dissection was carried down deep with hemostat  A soft tissue mass was noted and carefully dissected of vital surrounding soft tissue  Once this specimen was freed it was passed off to the back table and sent to pathology for analysis  All bleeders were ligated as necessary with bipolar  The surgical site was irrigated with copious amount of sterile saline solution with a bulb syringe  Subcutaneous tissue was reapproximated with 4-0 Vicryl suture  Skin closure was obtained with 4-0 nylon suture placed in a simple interrupted and horizontal mattress type suture technique  A postoperative injection consisting of 8 ml of 0 5% bupivacaine plain and 1 cc of dexamethasone was performed  The incision site was then dressed with Adaptic, dry sterile dressing, Ace wrap  The pneumatic ankle tourniquet was deflated at 31 min and normal hyperemic flush was noted to all digits  The patient tolerated the procedure and anesthesia well and was transported to the PACU with vital signs stable       I was present for the entire procedure    Patient Disposition:  PACU  and hemodynamically stable    SIGNATURE: Jovanni Ambrose DPM  DATE: October 30, 2018  TIME: 8:29 AM

## 2018-10-30 NOTE — H&P (VIEW-ONLY)
Assessment/Plan:    EKG is normal   Labs drawn for CBC  Form completed for preoperative H&P and medical clearance  Diagnoses and all orders for this visit:    Preop examination  -     Cancel: CBC and Platelet; Future  -     POCT ECG  -     CBC and Platelet    Mass of left foot    Hypothyroidism, unspecified type    Rheumatoid arthritis involving multiple sites, unspecified rheumatoid factor presence (HCC)    Other orders  -     methotrexate 50 MG/2ML injection;           Subjective:      Patient ID: Francesca Roche is a 60 y o  female  Patient is here for preoperative H&P and medical clearance exam for excision of soft tissue of the left foot scheduled on 10/30/2018 at [facility] with Dr Ashlee Montanez, podiatrist   She requests CBC and EKG  Patient denies bleeding problems or problems with anesthesia  The following portions of the patient's history were reviewed and updated as appropriate: allergies, current medications, past family history, past medical history, past social history, past surgical history and problem list     Review of Systems   Constitutional: Negative for activity change, appetite change, fatigue and unexpected weight change  HENT: Positive for congestion, postnasal drip and sneezing  Negative for ear pain, nosebleeds, rhinorrhea, sinus pain, sinus pressure and sore throat  Eyes: Negative  Respiratory: Negative for cough, chest tightness, shortness of breath and wheezing  Cardiovascular: Negative for chest pain, palpitations and leg swelling  Gastrointestinal: Negative for abdominal pain, blood in stool, constipation, diarrhea, nausea and vomiting  Endocrine: Negative for cold intolerance and heat intolerance  Genitourinary: Negative for difficulty urinating, dysuria, frequency, hematuria and urgency  Musculoskeletal: Positive for arthralgias, back pain, joint swelling, myalgias, neck pain and neck stiffness  Negative for gait problem  Skin: Negative  Neurological: Negative for dizziness, syncope, weakness, light-headedness, numbness and headaches  Hematological: Negative for adenopathy  Does not bruise/bleed easily  Psychiatric/Behavioral: Negative for dysphoric mood and sleep disturbance  The patient is not nervous/anxious  Objective:      /72   Pulse 80   Temp (!) 97 2 °F (36 2 °C)   Resp 16   Ht 5' 4 96" (1 65 m)   Wt 61 7 kg (136 lb)   LMP 05/18/2006   SpO2 98%   BMI 22 66 kg/m²          Physical Exam   Constitutional: She is oriented to person, place, and time  She appears well-developed and well-nourished  HENT:   Head: Normocephalic  Right Ear: External ear normal    Left Ear: External ear normal    Nose: Nose normal    Mouth/Throat: Oropharynx is clear and moist    Eyes: Conjunctivae are normal  No scleral icterus  Neck: Neck supple  Cardiovascular: Normal rate and regular rhythm  Pulmonary/Chest: Effort normal and breath sounds normal    Abdominal: Soft  There is no tenderness  Musculoskeletal: She exhibits no edema  Lymphadenopathy:     She has no cervical adenopathy  Neurological: She is alert and oriented to person, place, and time  Skin: Skin is warm and dry  Psychiatric: She has a normal mood and affect

## 2018-11-26 ENCOUNTER — OFFICE VISIT (OUTPATIENT)
Dept: FAMILY MEDICINE CLINIC | Facility: CLINIC | Age: 63
End: 2018-11-26
Payer: COMMERCIAL

## 2018-11-26 ENCOUNTER — APPOINTMENT (OUTPATIENT)
Dept: RADIOLOGY | Facility: MEDICAL CENTER | Age: 63
End: 2018-11-26
Payer: COMMERCIAL

## 2018-11-26 VITALS
WEIGHT: 137 LBS | SYSTOLIC BLOOD PRESSURE: 132 MMHG | BODY MASS INDEX: 23.39 KG/M2 | HEART RATE: 76 BPM | HEIGHT: 64 IN | DIASTOLIC BLOOD PRESSURE: 80 MMHG | RESPIRATION RATE: 16 BRPM | TEMPERATURE: 97.4 F

## 2018-11-26 DIAGNOSIS — M54.16 LUMBAR RADICULOPATHY: ICD-10-CM

## 2018-11-26 DIAGNOSIS — M54.31 SCIATICA OF RIGHT SIDE: ICD-10-CM

## 2018-11-26 DIAGNOSIS — M54.31 SCIATICA OF RIGHT SIDE: Primary | ICD-10-CM

## 2018-11-26 PROCEDURE — 72110 X-RAY EXAM L-2 SPINE 4/>VWS: CPT

## 2018-11-26 PROCEDURE — 99214 OFFICE O/P EST MOD 30 MIN: CPT | Performed by: FAMILY MEDICINE

## 2018-11-26 PROCEDURE — 1036F TOBACCO NON-USER: CPT | Performed by: FAMILY MEDICINE

## 2018-11-26 PROCEDURE — 3008F BODY MASS INDEX DOCD: CPT | Performed by: FAMILY MEDICINE

## 2018-11-26 RX ORDER — METHOTREXATE 25 MG/ML
INJECTION, SOLUTION INTRA-ARTERIAL; INTRAMUSCULAR; INTRAVENOUS
Refills: 3 | COMMUNITY
Start: 2018-11-14 | End: 2020-05-29

## 2018-11-26 RX ORDER — PREDNISONE 1 MG/1
5 TABLET ORAL AS NEEDED
Refills: 2 | COMMUNITY
Start: 2018-11-13

## 2018-11-26 RX ORDER — OXYCODONE HYDROCHLORIDE AND ACETAMINOPHEN 5; 325 MG/1; MG/1
TABLET ORAL EVERY 6 HOURS PRN
COMMUNITY
Start: 2015-05-19 | End: 2018-11-26 | Stop reason: SDUPTHER

## 2018-11-26 NOTE — PROGRESS NOTES
Assessment/Plan:    Discussed diagnostic and treatment options with patient  Patient is being sent for x-ray of the lumbar spine  Patient is being referred for physical therapy evaluation and treatment  Patient may continue ibuprofen 600 mg q 6 hours with food and recommend she discontinue prednisone  Patient may continue cyclobenzaprine 10 mg 1 t i d  p r n , caution regarding drowsiness  Patient may continue ice p r n  Devendra Manifold Return the office in 1-2 weeks or sooner p r n  Devendra Manifold Diagnoses and all orders for this visit:    Sciatica of right side  Comments:  X-ray lumbar spine  Ibuprofen 600 mg q 6 hours and cyclobenzaprine 10 mg t i d  p r n   Ice p r n   Orders:  -     XR spine lumbar minimum 4 views non injury; Future  -     Ambulatory referral to Physical Therapy; Future    Lumbar radiculopathy  Comments:  Referral for physical therapy evaluation and treatment  Orders:  -     XR spine lumbar minimum 4 views non injury; Future  -     Ambulatory referral to Physical Therapy; Future    Other orders  -     predniSONE 5 mg tablet; Take 5 mg by mouth 2 (two) times a day  -     methotrexate 50 MG/2ML injection; INJECT 0 6ML ONCE A WEEK  -     Discontinue: oxyCODONE-acetaminophen (PERCOCET) 5-325 mg per tablet; every 6 (six) hours as needed  -     Tocilizumab (ACTEMRA IV); Infuse into a venous catheter          Subjective:      Patient ID: Wilton Campa is a 60 y o  female  Patient started 1 week ago with right-sided low back pain and right hip pain  She complains of pain, numbness and tingling radiating into her right buttocks and down her right leg to the foot intermittently  Patient denies any specific injury or fall although admits to left foot surgery 4 weeks ago causing some limping when she walks  Patient denies any bowel or bladder problems  She has been treating this with ibuprofen, prednisone, cyclobenzaprine and ice with some relief  Back Pain   This is a new problem   The current episode started in the past 7 days  The problem is unchanged  The pain is present in the lumbar spine  The pain radiates to the right thigh and right foot  The symptoms are aggravated by standing, bending and sitting  Associated symptoms include leg pain, numbness, paresthesias and tingling  Pertinent negatives include no bladder incontinence, bowel incontinence, dysuria, fever, pelvic pain, perianal numbness or weakness  She has tried NSAIDs, ice, heat and muscle relaxant for the symptoms  The treatment provided mild relief  Hip Pain    Associated symptoms include numbness and tingling  The following portions of the patient's history were reviewed and updated as appropriate: allergies, current medications, past family history, past medical history, past social history, past surgical history and problem list     Review of Systems   Constitutional: Negative for fever  Gastrointestinal: Negative for bowel incontinence  Genitourinary: Negative for bladder incontinence, dysuria and pelvic pain  Musculoskeletal: Positive for back pain  Neurological: Positive for tingling, numbness and paresthesias  Negative for weakness  Objective:      /80   Pulse 76   Temp (!) 97 4 °F (36 3 °C) (Tympanic)   Resp 16   Ht 5' 4" (1 626 m)   Wt 62 1 kg (137 lb)   LMP 05/18/2006   BMI 23 52 kg/m²          Physical Exam   Constitutional: She is oriented to person, place, and time  She appears well-developed and well-nourished  No distress  HENT:   Head: Normocephalic  Mouth/Throat: Oropharynx is clear and moist    Eyes: Conjunctivae are normal  No scleral icterus  Neck: Neck supple  Cardiovascular: Normal rate and regular rhythm  Pulmonary/Chest: Effort normal and breath sounds normal    Abdominal: Soft  There is no tenderness  Musculoskeletal: She exhibits tenderness  She exhibits no edema  Positive right lumbosacral tenderness and positive forward flexion tenderness  Positive right straight leg raise    Toe and heel walk intact  Lower extremity DTRs and strength intact  Lymphadenopathy:     She has no cervical adenopathy  Neurological: She is alert and oriented to person, place, and time  She has normal reflexes  Skin: Skin is warm and dry  Psychiatric: She has a normal mood and affect

## 2018-11-29 ENCOUNTER — TELEPHONE (OUTPATIENT)
Dept: FAMILY MEDICINE CLINIC | Facility: CLINIC | Age: 63
End: 2018-11-29

## 2018-11-30 NOTE — TELEPHONE ENCOUNTER
Pt is aware of results, however she states she is still currently in a lot of pain and is not sure if there is anything else you can do for her?  She already takes Percocet, Flexeril, and Extra Strength Tylenol and does home exercises since she is not currently in PT

## 2018-12-03 DIAGNOSIS — E03.9 HYPOTHYROIDISM, UNSPECIFIED TYPE: ICD-10-CM

## 2018-12-03 RX ORDER — LEVOTHYROXINE SODIUM 50 MCG
TABLET ORAL
Qty: 90 TABLET | Refills: 2 | Status: SHIPPED | OUTPATIENT
Start: 2018-12-03 | End: 2019-09-01 | Stop reason: SDUPTHER

## 2019-02-04 DIAGNOSIS — M06.9 RHEUMATOID ARTHRITIS, INVOLVING UNSPECIFIED SITE, UNSPECIFIED RHEUMATOID FACTOR PRESENCE: ICD-10-CM

## 2019-02-04 RX ORDER — CYCLOBENZAPRINE HCL 10 MG
10 TABLET ORAL
Qty: 90 TABLET | Refills: 1 | Status: SHIPPED | OUTPATIENT
Start: 2019-02-04 | End: 2019-08-05 | Stop reason: SDUPTHER

## 2019-02-04 NOTE — TELEPHONE ENCOUNTER
Took call from patient requesting refill for Flexeril 10 mg 90 day supply to be sent over to [facility]      Patient no longer using mail order pharmacy       Please advise

## 2019-03-27 ENCOUNTER — TELEPHONE (OUTPATIENT)
Dept: FAMILY MEDICINE CLINIC | Facility: CLINIC | Age: 64
End: 2019-03-27

## 2019-03-27 NOTE — TELEPHONE ENCOUNTER
Pt called back, as she has not got a colonoscopy done yet   Discussed FIT kit, she will think about it and call back

## 2019-05-24 ENCOUNTER — ANNUAL EXAM (OUTPATIENT)
Dept: OBGYN CLINIC | Facility: MEDICAL CENTER | Age: 64
End: 2019-05-24
Payer: COMMERCIAL

## 2019-05-24 VITALS
WEIGHT: 130.5 LBS | BODY MASS INDEX: 22.28 KG/M2 | DIASTOLIC BLOOD PRESSURE: 76 MMHG | SYSTOLIC BLOOD PRESSURE: 116 MMHG | HEIGHT: 64 IN

## 2019-05-24 DIAGNOSIS — Z12.31 ENCOUNTER FOR SCREENING MAMMOGRAM FOR MALIGNANT NEOPLASM OF BREAST: ICD-10-CM

## 2019-05-24 DIAGNOSIS — Z01.419 ENCNTR FOR GYN EXAM (GENERAL) (ROUTINE) W/O ABN FINDINGS: Primary | ICD-10-CM

## 2019-05-24 PROCEDURE — S0612 ANNUAL GYNECOLOGICAL EXAMINA: HCPCS | Performed by: OBSTETRICS & GYNECOLOGY

## 2019-05-24 RX ORDER — TOCILIZUMAB 180 MG/ML
INJECTION, SOLUTION SUBCUTANEOUS
COMMUNITY
Start: 2019-04-18 | End: 2022-07-05

## 2019-06-20 ENCOUNTER — HOSPITAL ENCOUNTER (OUTPATIENT)
Dept: RADIOLOGY | Facility: IMAGING CENTER | Age: 64
Discharge: HOME | End: 2019-06-20
Payer: COMMERCIAL

## 2019-06-20 VITALS — WEIGHT: 130 LBS | HEIGHT: 64 IN | BODY MASS INDEX: 22.2 KG/M2

## 2019-06-20 DIAGNOSIS — Z12.31 ENCOUNTER FOR SCREENING MAMMOGRAM FOR MALIGNANT NEOPLASM OF BREAST: ICD-10-CM

## 2019-06-20 PROCEDURE — 77067 SCR MAMMO BI INCL CAD: CPT

## 2019-08-05 DIAGNOSIS — M06.9 RHEUMATOID ARTHRITIS, INVOLVING UNSPECIFIED SITE, UNSPECIFIED RHEUMATOID FACTOR PRESENCE: ICD-10-CM

## 2019-08-05 RX ORDER — CYCLOBENZAPRINE HCL 10 MG
10 TABLET ORAL
Qty: 90 TABLET | Refills: 1 | Status: SHIPPED | OUTPATIENT
Start: 2019-08-05 | End: 2020-01-06 | Stop reason: SDUPTHER

## 2019-08-09 ENCOUNTER — TELEPHONE (OUTPATIENT)
Dept: FAMILY MEDICINE CLINIC | Facility: CLINIC | Age: 64
End: 2019-08-09

## 2019-09-01 DIAGNOSIS — E03.9 HYPOTHYROIDISM, UNSPECIFIED TYPE: ICD-10-CM

## 2019-09-02 RX ORDER — LEVOTHYROXINE SODIUM 50 MCG
TABLET ORAL
Qty: 90 TABLET | Refills: 4 | Status: SHIPPED | OUTPATIENT
Start: 2019-09-02 | End: 2020-04-06 | Stop reason: SDUPTHER

## 2019-09-12 ENCOUNTER — OFFICE VISIT (OUTPATIENT)
Dept: FAMILY MEDICINE CLINIC | Facility: CLINIC | Age: 64
End: 2019-09-12
Payer: COMMERCIAL

## 2019-09-12 VITALS
DIASTOLIC BLOOD PRESSURE: 82 MMHG | HEART RATE: 92 BPM | SYSTOLIC BLOOD PRESSURE: 134 MMHG | WEIGHT: 129 LBS | RESPIRATION RATE: 18 BRPM | HEIGHT: 64 IN | TEMPERATURE: 98.8 F | BODY MASS INDEX: 22.02 KG/M2 | OXYGEN SATURATION: 98 %

## 2019-09-12 DIAGNOSIS — S00.262A INSECT BITE OF LEFT EYELID, INITIAL ENCOUNTER: Primary | ICD-10-CM
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
## 2019-09-12 PROCEDURE — 99213 OFFICE O/P EST LOW 20 MIN: CPT | Performed by: FAMILY MEDICINE

## 2019-09-12 PROCEDURE — 3008F BODY MASS INDEX DOCD: CPT | Performed by: FAMILY MEDICINE

## 2019-09-12 RX ORDER — METHYLPREDNISOLONE 4 MG/1
TABLET ORAL
Qty: 21 EACH | Refills: 0 | Status: SHIPPED | OUTPATIENT
Start: 2019-09-12 | End: 2021-06-22

## 2019-09-12 RX ORDER — AZITHROMYCIN 250 MG/1
TABLET, FILM COATED ORAL
Qty: 6 TABLET | Refills: 0 | Status: SHIPPED | OUTPATIENT
Start: 2019-09-12 | End: 2019-09-16

## 2019-09-12 NOTE — PROGRESS NOTES
Assessment/Plan:  Patient will be started on Medrol Dosepak and Z-Bang  Patient may take Zyrtec or Benadryl p r n  Nikky Bermudez Patient may apply ice or cool compresses p r n  Recommend increase fluids and rest   Return to the office in 1 week or sooner yasmany Bermudez Diagnoses and all orders for this visit:    Insect bite of left eyelid, initial encounter  -     methylPREDNISolone 4 MG tablet therapy pack; Use as directed on package  -     azithromycin (ZITHROMAX) 250 mg tablet; Take 2 tablets today then 1 tablet daily x 4 days          Subjective:      Patient ID: Bria Spann is a 58 y o  female  Patient sustained an insect bite above her left eye last evening  She complains of itching, redness and swelling of her eyelids  She denies fever, chills or sweats  She denies tongue swelling, difficulty swallowing, chest tightness or wheezing  She has treated this with Benadryl and ice without significant relief  Insect Bite   This is a new problem  The current episode started yesterday  The problem has been gradually worsening  Associated symptoms include a rash  Pertinent negatives include no chills, diaphoresis, fatigue, fever, headaches or sore throat  Rash   This is a new problem  The current episode started yesterday  The affected locations include the face  The rash is characterized by redness, swelling and itchiness  She was exposed to an insect bite/sting  Pertinent negatives include no fatigue, fever, shortness of breath or sore throat  Past treatments include cold compress and antihistamine  The treatment provided mild relief  The following portions of the patient's history were reviewed and updated as appropriate: allergies, current medications, past family history, past medical history, past social history, past surgical history and problem list     Review of Systems   Constitutional: Negative for chills, diaphoresis, fatigue and fever  HENT: Negative for sore throat      Respiratory: Negative for shortness of breath  Skin: Positive for rash  Neurological: Negative for headaches  Objective:      /82   Pulse 92   Temp 98 8 °F (37 1 °C) (Oral)   Resp 18   Ht 5' 4" (1 626 m)   Wt 58 5 kg (129 lb)   LMP 05/18/2006   SpO2 98% Comment: Room Air  BMI 22 14 kg/m²          Physical Exam   Constitutional: She is oriented to person, place, and time  She appears well-developed and well-nourished  No distress  HENT:   Head: Normocephalic  Right Ear: External ear normal    Left Ear: External ear normal    Nose: Nose normal    Mouth/Throat: Oropharynx is clear and moist    Eyes: Conjunctivae are normal  No scleral icterus  Positive erythema and swelling left upper and lower eyelids  Neck: Neck supple  Cardiovascular: Normal rate and regular rhythm  Pulmonary/Chest: Effort normal and breath sounds normal  She has no wheezes  Abdominal: Soft  There is no tenderness  Musculoskeletal: She exhibits no edema  Lymphadenopathy:     She has no cervical adenopathy  Neurological: She is alert and oriented to person, place, and time  Skin: Skin is warm and dry  Psychiatric: She has a normal mood and affect

## 2020-01-06 DIAGNOSIS — M06.9 RHEUMATOID ARTHRITIS, INVOLVING UNSPECIFIED SITE, UNSPECIFIED RHEUMATOID FACTOR PRESENCE: ICD-10-CM

## 2020-01-06 RX ORDER — CYCLOBENZAPRINE HCL 10 MG
10 TABLET ORAL
Qty: 90 TABLET | Refills: 1 | Status: SHIPPED | OUTPATIENT
Start: 2020-01-06 | End: 2020-06-12 | Stop reason: SDUPTHER

## 2020-04-06 DIAGNOSIS — E03.9 HYPOTHYROIDISM, UNSPECIFIED TYPE: ICD-10-CM

## 2020-04-06 RX ORDER — LEVOTHYROXINE SODIUM 50 MCG
50 TABLET ORAL DAILY
Qty: 90 TABLET | Refills: 4 | Status: SHIPPED | OUTPATIENT
Start: 2020-04-06 | End: 2021-04-03

## 2020-05-29 ENCOUNTER — ANNUAL EXAM (OUTPATIENT)
Dept: OBGYN CLINIC | Facility: MEDICAL CENTER | Age: 65
End: 2020-05-29
Payer: COMMERCIAL

## 2020-05-29 VITALS
TEMPERATURE: 97.7 F | SYSTOLIC BLOOD PRESSURE: 124 MMHG | WEIGHT: 125 LBS | BODY MASS INDEX: 20.83 KG/M2 | HEIGHT: 65 IN | DIASTOLIC BLOOD PRESSURE: 72 MMHG

## 2020-05-29 DIAGNOSIS — Z01.419 ENCNTR FOR GYN EXAM (GENERAL) (ROUTINE) W/O ABN FINDINGS: Primary | ICD-10-CM

## 2020-05-29 DIAGNOSIS — Z12.11 ENCOUNTER FOR SCREENING COLONOSCOPY: ICD-10-CM

## 2020-05-29 DIAGNOSIS — Z12.31 ENCOUNTER FOR SCREENING MAMMOGRAM FOR MALIGNANT NEOPLASM OF BREAST: ICD-10-CM

## 2020-05-29 PROCEDURE — G0101 CA SCREEN;PELVIC/BREAST EXAM: HCPCS | Performed by: OBSTETRICS & GYNECOLOGY

## 2020-06-05 ENCOUNTER — TELEPHONE (OUTPATIENT)
Dept: FAMILY MEDICINE CLINIC | Facility: CLINIC | Age: 65
End: 2020-06-05

## 2020-06-12 ENCOUNTER — OFFICE VISIT (OUTPATIENT)
Dept: FAMILY MEDICINE CLINIC | Facility: CLINIC | Age: 65
End: 2020-06-12
Payer: COMMERCIAL

## 2020-06-12 VITALS
BODY MASS INDEX: 21.34 KG/M2 | RESPIRATION RATE: 16 BRPM | DIASTOLIC BLOOD PRESSURE: 72 MMHG | HEIGHT: 64 IN | WEIGHT: 125 LBS | SYSTOLIC BLOOD PRESSURE: 114 MMHG | HEART RATE: 88 BPM | TEMPERATURE: 98.4 F

## 2020-06-12 DIAGNOSIS — M06.9 RHEUMATOID ARTHRITIS, INVOLVING UNSPECIFIED SITE, UNSPECIFIED RHEUMATOID FACTOR PRESENCE: ICD-10-CM

## 2020-06-12 DIAGNOSIS — J30.9 ALLERGIC RHINITIS, UNSPECIFIED SEASONALITY, UNSPECIFIED TRIGGER: ICD-10-CM

## 2020-06-12 DIAGNOSIS — E03.9 HYPOTHYROIDISM, UNSPECIFIED TYPE: ICD-10-CM

## 2020-06-12 DIAGNOSIS — Z00.00 MEDICARE ANNUAL WELLNESS VISIT, INITIAL: Primary | ICD-10-CM

## 2020-06-12 DIAGNOSIS — E55.9 VITAMIN D DEFICIENCY: ICD-10-CM

## 2020-06-12 DIAGNOSIS — E78.00 HYPERCHOLESTEROLEMIA: ICD-10-CM

## 2020-06-12 LAB
CHOLEST SERPL-MCNC: 215 MG/DL (ref 50–200)
HCV AB SER QL: NORMAL
HDLC SERPL-MCNC: 92 MG/DL
LDLC SERPL CALC-MCNC: 113 MG/DL (ref 0–100)
NONHDLC SERPL-MCNC: 123 MG/DL
TRIGL SERPL-MCNC: 50 MG/DL
TSH SERPL DL<=0.05 MIU/L-ACNC: 0.63 UIU/ML (ref 0.36–3.74)

## 2020-06-12 PROCEDURE — 86803 HEPATITIS C AB TEST: CPT | Performed by: FAMILY MEDICINE

## 2020-06-12 PROCEDURE — 84443 ASSAY THYROID STIM HORMONE: CPT | Performed by: FAMILY MEDICINE

## 2020-06-12 PROCEDURE — 3008F BODY MASS INDEX DOCD: CPT | Performed by: FAMILY MEDICINE

## 2020-06-12 PROCEDURE — G0438 PPPS, INITIAL VISIT: HCPCS | Performed by: FAMILY MEDICINE

## 2020-06-12 PROCEDURE — 80061 LIPID PANEL: CPT | Performed by: FAMILY MEDICINE

## 2020-06-12 PROCEDURE — 99214 OFFICE O/P EST MOD 30 MIN: CPT | Performed by: FAMILY MEDICINE

## 2020-06-12 PROCEDURE — 4040F PNEUMOC VAC/ADMIN/RCVD: CPT | Performed by: FAMILY MEDICINE

## 2020-06-12 PROCEDURE — 36415 COLL VENOUS BLD VENIPUNCTURE: CPT | Performed by: FAMILY MEDICINE

## 2020-06-12 PROCEDURE — 1036F TOBACCO NON-USER: CPT | Performed by: FAMILY MEDICINE

## 2020-06-12 PROCEDURE — 93000 ELECTROCARDIOGRAM COMPLETE: CPT | Performed by: FAMILY MEDICINE

## 2020-06-12 RX ORDER — CYCLOBENZAPRINE HCL 10 MG
10 TABLET ORAL
Qty: 90 TABLET | Refills: 1 | Status: SHIPPED | OUTPATIENT
Start: 2020-06-12 | End: 2020-12-17

## 2020-06-12 RX ORDER — GLUCOSAMINE/D3/BOSWELLIA SERRA 1500MG-400
TABLET ORAL
COMMUNITY

## 2020-06-29 ENCOUNTER — HOSPITAL ENCOUNTER (OUTPATIENT)
Dept: MAMMOGRAPHY | Facility: CLINIC | Age: 65
Discharge: HOME | End: 2020-06-29
Payer: COMMERCIAL

## 2020-06-29 VITALS — BODY MASS INDEX: 21.34 KG/M2 | HEIGHT: 64 IN | WEIGHT: 125 LBS

## 2020-06-29 DIAGNOSIS — Z12.31 ENCOUNTER FOR SCREENING MAMMOGRAM FOR MALIGNANT NEOPLASM OF BREAST: ICD-10-CM

## 2020-06-29 PROCEDURE — 77063 BREAST TOMOSYNTHESIS BI: CPT

## 2020-06-29 PROCEDURE — 77067 SCR MAMMO BI INCL CAD: CPT

## 2020-10-22 ENCOUNTER — TELEPHONE (OUTPATIENT)
Dept: GASTROENTEROLOGY | Facility: CLINIC | Age: 65
End: 2020-10-22

## 2020-12-17 DIAGNOSIS — M06.9 RHEUMATOID ARTHRITIS (HCC): ICD-10-CM

## 2020-12-17 RX ORDER — CYCLOBENZAPRINE HCL 10 MG
TABLET ORAL
Qty: 90 TABLET | Refills: 1 | Status: SHIPPED | OUTPATIENT
Start: 2020-12-17 | End: 2022-07-05 | Stop reason: SDUPTHER

## 2021-03-10 DIAGNOSIS — Z23 ENCOUNTER FOR IMMUNIZATION: ICD-10-CM

## 2021-04-02 DIAGNOSIS — E03.9 HYPOTHYROIDISM, UNSPECIFIED TYPE: ICD-10-CM

## 2021-04-03 RX ORDER — LEVOTHYROXINE SODIUM 50 MCG
TABLET ORAL
Qty: 90 TABLET | Refills: 0 | Status: SHIPPED | OUTPATIENT
Start: 2021-04-03 | End: 2021-06-23

## 2021-04-15 ENCOUNTER — TELEPHONE (OUTPATIENT)
Dept: FAMILY MEDICINE CLINIC | Facility: CLINIC | Age: 66
End: 2021-04-15

## 2021-06-01 ENCOUNTER — ANNUAL EXAM (OUTPATIENT)
Dept: OBGYN CLINIC | Facility: MEDICAL CENTER | Age: 66
End: 2021-06-01
Payer: COMMERCIAL

## 2021-06-01 VITALS
HEIGHT: 64 IN | DIASTOLIC BLOOD PRESSURE: 84 MMHG | WEIGHT: 123 LBS | SYSTOLIC BLOOD PRESSURE: 124 MMHG | BODY MASS INDEX: 21 KG/M2

## 2021-06-01 DIAGNOSIS — Z12.11 COLON CANCER SCREENING: ICD-10-CM

## 2021-06-01 DIAGNOSIS — M81.0 OSTEOPOROSIS WITHOUT CURRENT PATHOLOGICAL FRACTURE, UNSPECIFIED OSTEOPOROSIS TYPE: ICD-10-CM

## 2021-06-01 DIAGNOSIS — Z01.419 WELL WOMAN EXAM WITH ROUTINE GYNECOLOGICAL EXAM: Primary | ICD-10-CM

## 2021-06-01 DIAGNOSIS — L29.2 VULVAR ITCHING: ICD-10-CM

## 2021-06-01 DIAGNOSIS — Z12.31 ENCOUNTER FOR SCREENING MAMMOGRAM FOR MALIGNANT NEOPLASM OF BREAST: ICD-10-CM

## 2021-06-01 DIAGNOSIS — L90.0 LICHEN SCLEROSUS ET ATROPHICUS: ICD-10-CM

## 2021-06-01 DIAGNOSIS — Z79.52 LONG TERM SYSTEMIC STEROID USER: ICD-10-CM

## 2021-06-01 DIAGNOSIS — R92.2 DENSE BREAST TISSUE ON MAMMOGRAM: ICD-10-CM

## 2021-06-01 PROCEDURE — 87624 HPV HI-RISK TYP POOLED RSLT: CPT | Performed by: OBSTETRICS & GYNECOLOGY

## 2021-06-01 PROCEDURE — G0101 CA SCREEN;PELVIC/BREAST EXAM: HCPCS | Performed by: OBSTETRICS & GYNECOLOGY

## 2021-06-01 PROCEDURE — G0145 SCR C/V CYTO,THINLAYER,RESCR: HCPCS | Performed by: OBSTETRICS & GYNECOLOGY

## 2021-06-01 RX ORDER — TRIAMCINOLONE ACETONIDE 5 MG/G
OINTMENT TOPICAL 2 TIMES DAILY
Qty: 15 G | Refills: 3 | Status: SHIPPED | OUTPATIENT
Start: 2021-06-01 | End: 2021-07-27

## 2021-06-01 NOTE — PROGRESS NOTES
Assessment   76 y o  postmenopausal female who is sexually active presenting for annual exam      Plan   Diagnoses and all orders for this visit:    Well woman exam with routine gynecological exam  -     Liquid-based pap, screening  - Mammo ordered  - Colonoscopy up to date  -  DEXA ordered  - Taking Ca/Vit D supplementation  - Return in 1yr for yearly    Encounter for screening mammogram for malignant neoplasm of breast  Dense breast tissue on mammogram  -     Mammo screening bilateral w 3d & cad; Future    Colon cancer screening  - Up to date    Osteoporosis without current pathological fracture, unspecified osteoporosis type  Long term systemic steroid user  -     DXA bone density spine hip and pelvis; Future  - Taking Ca/Vit D    Lichen sclerosus et atrophicus  Vulvar itching  -     triamcinolone (KENALOG) 0 5 % ointment; Apply topically 2 (two) times a day  - Return in 6-8wks for reassessment  - Advised if agglutination still noted, can consider localized estrogen therapy      __________________________________________________________________      Subjective     76 y o  postmenopausal female who is sexually active presenting for annual exam      Reports itching x2mo without discharge or odor  Intermittent in nature and worse at night  GYN  Complaints: denies  Denies dyspareunia, genital discharge, genital ulcers, pelvic pain and vulvar/vaginal symptoms  Menopause occurred at age 53-48  She has had no bleeding since this time    Menopausal symptoms: none  Sexually active: Yes - single partner - male  Hx STI: denies   Hx Abnormal pap: denies  Last pap: 2016 - NILM, HPV neg    OB  [id number] ( x2, c/s x1, 9lb 4oz)  Pregnancy complications: miscarriages, needed vaginal suppositories in preg      Complaints: denies  Denies urinary frequency, hematuria, urinary incontinence and dysuria    BREAST  Complaints: denies  Denies: breast lump, breast tenderness, changed mole, dryness, nipple discharge, pruritus, rash, skin color change and skin lesion(s)  Last mammogram:  - birads1  Personal hx: dense  Family hx: denies fhx of breast, uterine, ovarian, or colon cancers  Patient does do regular self-exams    GENERAL  PMH reviewed/updated and is as below  Patient does follow with a PCP  Retired  Denies domestic violence  Exercise: walks dog 2hr/day, stationary bike  Diet: lower diary intake    SCREENING  Cervical Ca: pap collected; ASCCP guidelines discussed  Breast Ca: mammo ordered  Colon Ca:  - colonoscopy - repeat 7yr   Metabolic: 9559  DEXA - osteoporosis      Past Medical History:   Diagnosis Date    DVT, lower extremity ([facility] [address] )     Right leg- was a smoker and on birth control    Fibromyalgia     History of transfusion     Post-op knee replacement  No adverse reaction    Hypothyroidism     Mass     Left foot    PONV (postoperative nausea and vomiting)     RA (rheumatoid arthritis) ([facility] [address] )     Wears glasses     For reading       Past Surgical History:   Procedure Laterality Date     SECTION      x1    CYST REMOVAL Left     excision - Baker's cyst - knee     FRACTURE SURGERY Right     Right ankle is fused  Has screws    JOINT REPLACEMENT Bilateral     TKR's   Lt- x1; Rt-x1 and then revision    KNEE ARTHROSCOPY Bilateral     HI EXC TUMOR SOFT TISSUE FOOT/TOE SUBFASC <1 5CM Left 10/30/2018    Procedure: FOOT SOFT TISSUE MASS EXCISION;  Surgeon: Livier Vicente DPM;  Location: [facility];  Service: Podiatry    TUBAL LIGATION      WISDOM TOOTH EXTRACTION           Current Outpatient Medications:     ACTEMRA 162 MG/0 9ML SOSY, , Disp: , Rfl:     Ascorbic Acid (VITAMIN C) 1000 MG tablet, Take 1,000 mg by mouth daily, Disp: , Rfl:     Biotin 67493 MCG TABS, Take by mouth, Disp: , Rfl:     Cholecalciferol 2000 units CAPS, Take 1 capsule by mouth, Disp: , Rfl:     cyclobenzaprine (FLEXERIL) 10 mg tablet, TAKE 1 TABLET BY MOUTH DAILY AT BEDTIME, Disp: 90 tablet, Rfl: 1    cycloSPORINE (RESTASIS) 0 05 % ophthalmic emulsion, Apply 1 drop to eye, Disp: , Rfl:     folic acid (FOLVITE) 1 mg tablet, Take 1 tablet by mouth daily, Disp: , Rfl:     hydrochlorothiazide (HYDRODIURIL) 25 mg tablet, Take 1 tablet by mouth daily as needed, Disp: , Rfl:     methotrexate 25 MG/ML, Inject as directed Weekly- 0 6 ml , Disp: , Rfl:     methylPREDNISolone 4 MG tablet therapy pack, Use as directed on package (Patient taking differently: Use as directed on package), Disp: 21 each, Rfl: 0    Multiple Vitamins-Minerals (HAIR/SKIN/NAILS/BIOTIN) TABS, Take 1 tablet by mouth daily, Disp: , Rfl:     Naproxen Sodium (ALEVE) 220 MG CAPS, Take by mouth as needed  , Disp: , Rfl:     oxyCODONE-acetaminophen (PERCOCET) 5-325 mg per tablet, Take 1 tablet by mouth every 4 (four) hours as needed , Disp: , Rfl:     predniSONE 5 mg tablet, Take 5 mg by mouth as needed , Disp: , Rfl: 2    Synthroid 50 MCG tablet, TAKE 1 TABLET BY MOUTH  DAILY, Disp: 90 tablet, Rfl: 0    Allergies   Allergen Reactions    Cephalexin Rash    Nuts - Food Allergy Hives and Swelling     Swelling in throat  Poppy seeds also cause this reaction    Erythromycin GI Intolerance    Penicillins Rash     Pt unsure of how bad reaction was-  Occurred in childhood    Ciprofloxacin Rash       Social History     Tobacco Use    Smoking status: Former Smoker     Packs/day: 1 00     Years: 10 00     Pack years: 10 00     Types: Cigarettes     Quit date:      Years since quittin 4    Smokeless tobacco: Never Used   Substance Use Topics    Alcohol use: Yes     Comment: 1 or 2 weekly    Drug use: No           Objective  /84   Ht 5' 4" (1 626 m)   Wt 55 8 kg (123 lb)   LMP 2006   BMI 21 11 kg/m²      Physical Exam:  Physical Exam  Exam conducted with a chaperone present  Constitutional:       General: She is not in acute distress  Appearance: Normal appearance  She is well-developed   She is not ill-appearing, toxic-appearing or diaphoretic  HENT:      Head: Normocephalic and atraumatic  Eyes:      General: No scleral icterus  Right eye: No discharge  Left eye: No discharge  Conjunctiva/sclera: Conjunctivae normal    Cardiovascular:      Rate and Rhythm: Normal rate  Pulmonary:      Effort: Pulmonary effort is normal  No accessory muscle usage or respiratory distress  Chest:      Breasts:         Right: No inverted nipple, mass, nipple discharge, skin change or tenderness  Left: No inverted nipple, mass, nipple discharge, skin change or tenderness  Abdominal:      General: There is no distension  Palpations: Abdomen is soft  There is no mass  Tenderness: There is no abdominal tenderness  There is no guarding or rebound  Genitourinary:     Exam position: Lithotomy position  Labia:         Right: No rash, tenderness or lesion  Left: No rash, tenderness or lesion  Urethra: No prolapse, urethral pain, urethral swelling or urethral lesion  Vagina: No signs of injury  No vaginal discharge, erythema (thin, pale epithelia  mild loss of rugae), tenderness or bleeding  Cervix: No cervical motion tenderness, discharge, friability, lesion or erythema  Uterus: Not enlarged, not fixed and not tender  Adnexa:         Right: No mass, tenderness or fullness  Left: No mass, tenderness or fullness  Rectum: No external hemorrhoid  Normal anal tone  Comments: Agglutination of the labia minora inferiorly, narrowing introitus  Thin, light, papery skin of vulva  Skin:     General: Skin is warm and dry  Coloration: Skin is not jaundiced  Findings: No bruising, erythema or rash  Neurological:      Mental Status: She is alert  Psychiatric:         Mood and Affect: Mood normal          Behavior: Behavior normal          Thought Content:  Thought content normal          Judgment: Judgment normal

## 2021-06-02 ENCOUNTER — OFFICE VISIT (OUTPATIENT)
Dept: URGENT CARE | Age: 66
End: 2021-06-02
Payer: COMMERCIAL

## 2021-06-02 VITALS
HEIGHT: 65 IN | TEMPERATURE: 96.3 F | HEART RATE: 84 BPM | SYSTOLIC BLOOD PRESSURE: 153 MMHG | OXYGEN SATURATION: 99 % | RESPIRATION RATE: 16 BRPM | DIASTOLIC BLOOD PRESSURE: 76 MMHG | BODY MASS INDEX: 20.79 KG/M2 | WEIGHT: 124.8 LBS

## 2021-06-02 DIAGNOSIS — T78.40XA ALLERGIC REACTION, INITIAL ENCOUNTER: Primary | ICD-10-CM

## 2021-06-02 DIAGNOSIS — S00.96XA INSECT BITE OF HEAD, UNSPECIFIED PART, INITIAL ENCOUNTER: ICD-10-CM
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
## 2021-06-02 LAB
HPV HR 12 DNA CVX QL NAA+PROBE: NEGATIVE
HPV16 DNA CVX QL NAA+PROBE: NEGATIVE
HPV18 DNA CVX QL NAA+PROBE: NEGATIVE

## 2021-06-02 PROCEDURE — 99213 OFFICE O/P EST LOW 20 MIN: CPT | Performed by: PHYSICIAN ASSISTANT

## 2021-06-02 RX ORDER — PREDNISONE 10 MG/1
TABLET ORAL
Qty: 20 TABLET | Refills: 0 | Status: SHIPPED | OUTPATIENT
Start: 2021-06-02 | End: 2021-06-22 | Stop reason: ALTCHOICE

## 2021-06-02 RX ORDER — SULFAMETHOXAZOLE AND TRIMETHOPRIM 800; 160 MG/1; MG/1
1 TABLET ORAL EVERY 12 HOURS SCHEDULED
Qty: 14 TABLET | Refills: 0 | Status: SHIPPED | OUTPATIENT
Start: 2021-06-02 | End: 2021-06-09

## 2021-06-02 NOTE — PROGRESS NOTES
[facility]        NAME: Sarah Lr is a 76 y o  female  : 1955    MRN: [id number]  DATE: 2021  TIME: 10:18 AM    Assessment and Plan   Allergic reaction, initial encounter Fabi Rivera  1  Allergic reaction, initial encounter  predniSONE 10 mg tablet   2  Insect bite of head, unspecified part, initial encounter  sulfamethoxazole-trimethoprim (BACTRIM DS) 800-160 mg per tablet         Patient Instructions     Follow up with PCP in 3-5 days  Proceed to  ER if symptoms worsen  Chief Complaint     Chief Complaint   Patient presents with    Eye Swelling     Pt was out working in the [employer] and is assuming she was bit buy a bug on her left forehead  Then woke up th morning with left eye and facial swelling  Pt did take benadryl 50mg tablet last night and 25mg this morning  History of Present Illness       40-year-old female presents for swelling and itching to the left side of her face  Patient states she was working in the yard last night approximately 2 hours later she noted her face to be itchy and it became swollen  She noted a small opening on her forehead but she is assuming is from insect  Patient states she took 50 mg of Benadryl last night and slept well  She took 25 mg of Benadryl today and drove herself for evaluation  She denies any pain, blurred vision, double vision, swelling of her lips or tongue, difficulty breathing  Review of Systems   Review of Systems   Constitutional: Negative  HENT: Positive for facial swelling  Negative for congestion, ear discharge, ear pain, hearing loss, mouth sores, nosebleeds, postnasal drip, rhinorrhea, sinus pressure, sinus pain, sneezing, sore throat, tinnitus, trouble swallowing and voice change  Eyes: Positive for itching  Negative for photophobia, pain, discharge, redness and visual disturbance  Respiratory: Negative  Gastrointestinal: Negative  Skin: Positive for wound  Neurological: Negative  Current Medications       Current Outpatient Medications:     ACTEMRA 162 MG/0 9ML SOSY, , Disp: , Rfl:     Ascorbic Acid (VITAMIN C) 1000 MG tablet, Take 1,000 mg by mouth daily, Disp: , Rfl:     Biotin 45976 MCG TABS, Take by mouth, Disp: , Rfl:     Cholecalciferol 2000 units CAPS, Take 1 capsule by mouth, Disp: , Rfl:     cyclobenzaprine (FLEXERIL) 10 mg tablet, TAKE 1 TABLET BY MOUTH DAILY AT BEDTIME, Disp: 90 tablet, Rfl: 1    cycloSPORINE (RESTASIS) 0 05 % ophthalmic emulsion, Apply 1 drop to eye, Disp: , Rfl:     folic acid (FOLVITE) 1 mg tablet, Take 1 tablet by mouth daily, Disp: , Rfl:     hydrochlorothiazide (HYDRODIURIL) 25 mg tablet, Take 1 tablet by mouth daily as needed, Disp: , Rfl:     methotrexate 25 MG/ML, Inject as directed Weekly- 0 6 ml , Disp: , Rfl:     methylPREDNISolone 4 MG tablet therapy pack, Use as directed on package, Disp: 21 each, Rfl: 0    Multiple Vitamins-Minerals (HAIR/SKIN/NAILS/BIOTIN) TABS, Take 1 tablet by mouth daily, Disp: , Rfl:     Naproxen Sodium (ALEVE) 220 MG CAPS, Take by mouth as needed  , Disp: , Rfl:     oxyCODONE-acetaminophen (PERCOCET) 5-325 mg per tablet, Take 1 tablet by mouth every 4 (four) hours as needed , Disp: , Rfl:     predniSONE 5 mg tablet, Take 5 mg by mouth as needed , Disp: , Rfl: 2    Synthroid 50 MCG tablet, TAKE 1 TABLET BY MOUTH  DAILY, Disp: 90 tablet, Rfl: 0    triamcinolone (KENALOG) 0 5 % ointment, Apply topically 2 (two) times a day, Disp: 15 g, Rfl: 3    predniSONE 10 mg tablet, Take 4 pills daily for 2 days, take 3 pills daily for 2 days, take 2 pills daily for 2 days, take 1 pill daily for 2 days, Disp: 20 tablet, Rfl: 0    sulfamethoxazole-trimethoprim (BACTRIM DS) 800-160 mg per tablet, Take 1 tablet by mouth every 12 (twelve) hours for 7 days, Disp: 14 tablet, Rfl: 0    Current Allergies     Allergies as of 06/02/2021 - Reviewed 06/02/2021   Allergen Reaction Noted    Cephalexin Rash 04/21/2012    Nuts - food allergy Hives and Swelling 03/17/2014    Erythromycin GI Intolerance 04/21/2012    Penicillins Rash 03/20/2012    Ciprofloxacin Rash             The following portions of the patient's history were reviewed and updated as appropriate: allergies, current medications, past family history, past medical history, past social history, past surgical history and problem list     Objective   /76   Pulse 84   Temp (!) 96 3 °F (35 7 °C)   Resp 16   Ht 5' 4 5" (1 638 m)   Wt 56 6 kg (124 lb 12 8 oz)   LMP 05/18/2006   SpO2 99%   BMI 21 09 kg/m²        Physical Exam     Physical Exam  Vitals signs and nursing note reviewed  Constitutional:       General: She is not in acute distress  Appearance: She is not ill-appearing  HENT:      Head:        Right Ear: Hearing, tympanic membrane, ear canal and external ear normal       Left Ear: Hearing, tympanic membrane, ear canal and external ear normal       Nose: Nose normal       Mouth/Throat:      Lips: Pink  Mouth: Mucous membranes are moist       Pharynx: Oropharynx is clear  Uvula midline  No uvula swelling  Comments:  No swelling of lips or tongue noted  Eyes:      General: Vision grossly intact  No visual field deficit  Right eye: No foreign body, discharge or hordeolum  Left eye: No foreign body, discharge or hordeolum  Extraocular Movements: Extraocular movements intact  Conjunctiva/sclera: Conjunctivae normal      Cardiovascular:      Rate and Rhythm: Normal rate and regular rhythm  Pulmonary:      Effort: Pulmonary effort is normal       Breath sounds: Normal breath sounds  Neurological:      Mental Status: She is alert

## 2021-06-02 NOTE — PATIENT INSTRUCTIONS
Allergic reaction due to insect bite  Continue with Benadryl  Take prednisone as directed  Currently no infection is noted  Antibiotics were called in for you since you are immunocompromised and is on your face  If you note any signs of redness or fever please start the antibiotic as soon as possible  Any worsening of symptoms go to the emergency room  General Allergic Reaction   WHAT YOU NEED TO KNOW:   An allergic reaction is your body's response to an allergen  Allergens include medicines, food, insect stings, animal dander, mold, latex, chemicals, and dust mites  Pollen from trees, grass, and weeds can also cause an allergic reaction  An allergic reaction can range from mild to severe  DISCHARGE INSTRUCTIONS:   Call 911 for signs or symptoms of anaphylaxis,  such as trouble breathing, swelling in your mouth or throat, or wheezing  You may also have itching, a rash, hives, or feel like you are going to faint  Return to the emergency department if:   · You have a skin rash, hives, swelling, or itching that is starting to get worse  · Your throat tightens, or your lips or tongue swell  · You have trouble swallowing or speaking  · You have worsening nausea, diarrhea, or abdominal cramps, or you are vomiting  · You have chest pain or tightness  Contact your healthcare provider if:   · You have questions or concerns about your condition or care  Medicines: You may need any of the following:  · Medicines  may be given to relieve certain allergy symptoms such as itching, sneezing, and swelling  You may take them as a pill or use drops in your nose or eyes  Topical treatments may be given to put directly on your skin to help decrease itching or swelling  · Epinephrine  may be prescribed if you are at risk for anaphylaxis  This is a severe allergic reaction that can be life-threatening  Your healthcare provider will tell you if you need to keep epinephrine with you   You will be taught when and how to use it  · Take your medicine as directed  Contact your healthcare provider if you think your medicine is not helping or if you have side effects  Tell him of her if you are allergic to any medicine  Keep a list of the medicines, vitamins, and herbs you take  Include the amounts, and when and why you take them  Bring the list or the pill bottles to follow-up visits  Carry your medicine list with you in case of an emergency  Follow up with your healthcare provider as directed:  Write down your questions so you remember to ask them during your visits  Manage your symptoms:   · Avoid allergens  You may need to have allergy testing with your healthcare provider or a specialist to find your allergens  · Use cold compresses on your skin or eyes  This will help soothe skin or eyes affected by the allergic reaction  You can make a cold compress by soaking a washcloth in cool water  Wring out the extra water before you apply the washcloth  · Rinse your nasal passages with a saline solution  Daily rinsing may help clear allergens out of your nose  Use distilled water if possible  You can also boil tap water and then let it cool before you use it  Do not use tap water without boiling it first     · Do not smoke  Nicotine and other chemicals in cigarettes and cigars can make an allergic reaction worse, and can also cause lung damage  Ask your healthcare provider for information if you currently smoke and need help to quit  E-cigarettes or smokeless tobacco still contain nicotine  Talk to your healthcare provider before you use these products  © Copyright [address] Information is for End User's use only and may not be sold, redistributed or otherwise used for commercial purposes  All illustrations and images included in CareNotes® are the copyrighted property of A D A M , Inc  or [facility][note 3] Roya Babin  The above information is an  only   It is not intended as medical advice for individual conditions or treatments  Talk to your doctor, nurse or pharmacist before following any medical regimen to see if it is safe and effective for you

## 2021-06-07 LAB
LAB AP GYN PRIMARY INTERPRETATION: NORMAL
Lab: NORMAL

## 2021-06-22 ENCOUNTER — OFFICE VISIT (OUTPATIENT)
Dept: FAMILY MEDICINE CLINIC | Facility: CLINIC | Age: 66
End: 2021-06-22
Payer: COMMERCIAL

## 2021-06-22 VITALS
BODY MASS INDEX: 20.49 KG/M2 | HEIGHT: 65 IN | HEART RATE: 84 BPM | TEMPERATURE: 97.5 F | RESPIRATION RATE: 16 BRPM | OXYGEN SATURATION: 98 % | DIASTOLIC BLOOD PRESSURE: 80 MMHG | SYSTOLIC BLOOD PRESSURE: 126 MMHG | WEIGHT: 123 LBS

## 2021-06-22 DIAGNOSIS — E55.9 VITAMIN D DEFICIENCY: ICD-10-CM

## 2021-06-22 DIAGNOSIS — M06.9 RHEUMATOID ARTHRITIS INVOLVING MULTIPLE SITES, UNSPECIFIED WHETHER RHEUMATOID FACTOR PRESENT (HCC): ICD-10-CM

## 2021-06-22 DIAGNOSIS — Z00.00 MEDICARE ANNUAL WELLNESS VISIT, SUBSEQUENT: ICD-10-CM

## 2021-06-22 DIAGNOSIS — Z12.11 ENCOUNTER FOR COLORECTAL CANCER SCREENING: ICD-10-CM

## 2021-06-22 DIAGNOSIS — E03.9 HYPOTHYROIDISM, UNSPECIFIED TYPE: ICD-10-CM

## 2021-06-22 DIAGNOSIS — Z23 NEED FOR PNEUMOCOCCAL VACCINE: ICD-10-CM

## 2021-06-22 DIAGNOSIS — E03.9 HYPOTHYROIDISM, UNSPECIFIED TYPE: Primary | ICD-10-CM

## 2021-06-22 DIAGNOSIS — Z12.12 ENCOUNTER FOR COLORECTAL CANCER SCREENING: ICD-10-CM

## 2021-06-22 DIAGNOSIS — J01.00 ACUTE MAXILLARY SINUSITIS, RECURRENCE NOT SPECIFIED: ICD-10-CM

## 2021-06-22 DIAGNOSIS — E78.00 HYPERCHOLESTEREMIA: ICD-10-CM

## 2021-06-22 PROCEDURE — G0009 ADMIN PNEUMOCOCCAL VACCINE: HCPCS

## 2021-06-22 PROCEDURE — 90670 PCV13 VACCINE IM: CPT

## 2021-06-22 PROCEDURE — 99214 OFFICE O/P EST MOD 30 MIN: CPT | Performed by: FAMILY MEDICINE

## 2021-06-22 PROCEDURE — G0439 PPPS, SUBSEQ VISIT: HCPCS | Performed by: FAMILY MEDICINE

## 2021-06-22 RX ORDER — DESONIDE 0.5 MG/G
CREAM TOPICAL
COMMUNITY

## 2021-06-22 RX ORDER — AZITHROMYCIN 250 MG/1
TABLET, FILM COATED ORAL
Qty: 6 TABLET | Refills: 0 | Status: SHIPPED | OUTPATIENT
Start: 2021-06-22 | End: 2021-06-26

## 2021-06-22 RX ORDER — KETOROLAC TROMETHAMINE 10 MG/1
10 TABLET, FILM COATED ORAL 3 TIMES DAILY
COMMUNITY
Start: 2021-06-08 | End: 2022-07-05

## 2021-06-22 NOTE — PROGRESS NOTES
Assessment and Plan:     Problem List Items Addressed This Visit     None      Visit Diagnoses     Encounter for colorectal cancer screening    -  Primary           Preventive health issues were discussed with patient, and age appropriate screening tests were ordered as noted in patient's After Visit Summary  Personalized health advice and appropriate referrals for health education or preventive services given if needed, as noted in patient's After Visit Summary  History of Present Illness:     Patient presents for Medicare Annual Wellness visit    Patient Care Team:  Cecilia Coronado DO as PCP - General  Cecilia Coronado DO as PCP - [address] [place] [address],[address] [facility] ([facility])  Charmaine Mercer DO     Problem List:     Patient Active Problem List   Diagnosis    Abnormal finding on mammography    Allergic rhinitis    Hypothyroidism    Rheumatoid arthritis ([facility] [address] )    Rosacea    Unsatisfactory cervical Papanicolaou smear    Vitamin D deficiency      Past Medical and Surgical History:     Past Medical History:   Diagnosis Date    DVT, lower extremity ([address] )     Right leg- was a smoker and on birth control    Fibromyalgia     History of transfusion     Post-op knee replacement  No adverse reaction    Hypothyroidism     Mass     Left foot    PONV (postoperative nausea and vomiting)     RA (rheumatoid arthritis) ([facility] [address] )     Wears glasses     For reading     Past Surgical History:   Procedure Laterality Date     SECTION      x1    COLONOSCOPY  ary     CYST REMOVAL Left     excision - Baker's cyst - knee     FRACTURE SURGERY Right     Right ankle is fused  Has screws    JOINT REPLACEMENT Bilateral     TKR's   Lt- x1; Rt-x1 and then revision    KNEE ARTHROSCOPY Bilateral     OK EXC TUMOR SOFT TISSUE FOOT/TOE SUBFASC <1 5CM Left 10/30/2018    Procedure: FOOT SOFT TISSUE MASS EXCISION;  Surgeon: Elis Charles DPM;  Location: [facility] OR;  Service: Podiatry    [address] [place] [address] EXTRACTION        Family History:     Family History   Problem Relation Age of Onset    Diabetes Mother     Hypertension Mother     Hypothyroidism Mother     Coronary artery disease Father     No Known Problems Daughter     No Known Problems Maternal Grandmother     Liver cancer Maternal Grandfather 59    No Known Problems Paternal Grandmother     Throat cancer Paternal Grandfather 79    No Known Problems Daughter     No Known Problems Paternal Aunt     No Known Problems Paternal Aunt     No Known Problems Paternal Aunt     No Known Problems Paternal Aunt     No Known Problems Paternal Aunt       Social History:     Social History     Socioeconomic History    Marital status: /Civil Union     Spouse name: None    Number of children: None    Years of education: None    Highest education level: None   Occupational History    None   Tobacco Use    Smoking status: Former Smoker     Packs/day: 1 00     Years: 10 00     Pack years: 10 00     Types: Cigarettes     Quit date:      Years since quittin 4    Smokeless tobacco: Never Used   Vaping Use    Vaping Use: Never used   Substance and Sexual Activity    Alcohol use: Yes     Comment: 1 or 2 weekly    Drug use: No    Sexual activity: Yes     Partners: Male     Birth control/protection: Female Sterilization   Other Topics Concern    None   Social History Narrative    None     Social Determinants of Health     Financial Resource Strain:     Difficulty of Paying Living Expenses:    Food Insecurity:     Worried About Running Out of Food in the Last Year:     Ran Out of Food in the Last Year:    Transportation Needs:     Lack of Transportation (Medical):      Lack of Transportation (Non-Medical):    Physical Activity:     Days of Exercise per Week:     Minutes of Exercise per Session:    Stress:     Feeling of Stress :    Social Connections:     Frequency of Communication with Friends and Family:     Frequency of Social Gatherings with Friends and Family:     Attends Restorationist Services:     Active Member of Clubs or Organizations:     Attends Club or Organization Meetings:     Marital Status:    Intimate Partner Violence:     Fear of Current or Ex-Partner:     Emotionally Abused:     Physically Abused:     Sexually Abused:       Medications and Allergies:     Current Outpatient Medications   Medication Sig Dispense Refill    ACTEMRA 162 MG/0 9ML SOSY       Ascorbic Acid (VITAMIN C) 1000 MG tablet Take 1,000 mg by mouth daily      Biotin 07865 MCG TABS Take by mouth      Cholecalciferol 2000 units CAPS Take 1 capsule by mouth      cyclobenzaprine (FLEXERIL) 10 mg tablet TAKE 1 TABLET BY MOUTH DAILY AT BEDTIME 90 tablet 1    cycloSPORINE (RESTASIS) 0 05 % ophthalmic emulsion Apply 1 drop to eye      folic acid (FOLVITE) 1 mg tablet Take 1 tablet by mouth daily      hydrochlorothiazide (HYDRODIURIL) 25 mg tablet Take 1 tablet by mouth daily as needed      ketorolac (TORADOL) 10 mg tablet Take 10 mg by mouth Three times a day      methotrexate 25 MG/ML Inject as directed Weekly- 0 6 ml       Multiple Vitamins-Minerals (HAIR/SKIN/NAILS/BIOTIN) TABS Take 1 tablet by mouth daily      Naproxen Sodium (ALEVE) 220 MG CAPS Take by mouth as needed        oxyCODONE-acetaminophen (PERCOCET) 5-325 mg per tablet Take 1 tablet by mouth every 4 (four) hours as needed       Synthroid 50 MCG tablet TAKE 1 TABLET BY MOUTH  DAILY 90 tablet 0    triamcinolone (KENALOG) 0 5 % ointment Apply topically 2 (two) times a day 15 g 3    desonide (DESOWEN) 0 05 % cream desonide 0 05 % topical cream   APPLY TO AFFECTED AREAS OF THE BODY TWICE A DAY FOR 2 WEEKS AS NEEDED FOR FLARES **NOT COVERED**      methylPREDNISolone 4 MG tablet therapy pack Use as directed on package (Patient not taking: Reported on 6/22/2021) 21 each 0    predniSONE 10 mg tablet Take 4 pills daily for 2 days, take 3 pills daily for 2 days, take 2 pills daily for 2 days, take 1 pill daily for 2 days 20 tablet 0    predniSONE 5 mg tablet Take 5 mg by mouth as needed  (Patient not taking: Reported on 6/22/2021)  2     No current facility-administered medications for this visit  Allergies   Allergen Reactions    Cephalexin Rash    Nuts - Food Allergy Hives and Swelling     Swelling in throat  Poppy seeds also cause this reaction    Erythromycin GI Intolerance    Penicillins Rash     Pt unsure of how bad reaction was-  Occurred in childhood    Ciprofloxacin Rash      Immunizations:     Immunization History   Administered Date(s) Administered    H1N1, All Formulations 11/20/2009    INFLUENZA 11/07/2020    Influenza, injectable, quadrivalent, preservative free 0 5 mL 09/23/2018, 10/12/2019    Influenza, seasonal, injectable 11/01/2015    Pneumococcal Polysaccharide PPV23 04/22/2013    SARS-CoV-2 / COVID-19 mRNA IM (Moderna) 03/20/2021, 04/17/2021    Zoster Vaccine Recombinant 09/23/2018      Health Maintenance:         Topic Date Due    Colorectal Cancer Screening  Never done    MAMMOGRAM  06/29/2021    Cervical Cancer Screening  06/01/2026    Hepatitis C Screening  Completed         Topic Date Due    DTaP,Tdap,and Td Vaccines (1 - Tdap) Never done    Pneumococcal Vaccine: 65+ Years (2 of 4 - PPSV23) 04/22/2018      Medicare Health Risk Assessment:     Pulse 85   Temp 97 5 °F (36 4 °C) (Temporal)   Ht 5' 5" (1 651 m)   Wt 55 8 kg (123 lb)   LMP 05/18/2006   SpO2 98%   BMI 20 47 kg/m²      Kodak Moncada is here for her Subsequent Wellness visit  Health Risk Assessment:   Patient rates overall health as good  Patient feels that their physical health rating is same  Patient is satisfied with their life  Eyesight was rated as same  Hearing was rated as same  Patient feels that their emotional and mental health rating is same  Patients states they are never, rarely angry  Patient states they are sometimes unusually tired/fatigued   Pain experienced in the last 7 days has been some  Patient's pain rating has been 7/10  Patient states that she has experienced no weight loss or gain in last 6 months  Fall Risk Screening: In the past year, patient has experienced: no history of falling in past year      Urinary Incontinence Screening:   Patient has not leaked urine accidently in the last six months  Home Safety:  Patient has trouble with stairs inside or outside of their home  Patient has working smoke alarms and has working carbon monoxide detector  Home safety hazards include: none  Nutrition:   Current diet is Regular  Medications:   Patient is currently taking over-the-counter supplements  OTC medications include: Vitamin C, E, D  Biotin  Multivitamin  Calcium, Folic Acid  Patient is able to manage medications  Activities of Daily Living (ADLs)/Instrumental Activities of Daily Living (IADLs):   Walk and transfer into and out of bed and chair?: Yes  Dress and groom yourself?: Yes    Bathe or shower yourself?: Yes    Feed yourself?  Yes  Do your laundry/housekeeping?: Yes  Manage your money, pay your bills and track your expenses?: Yes  Make your own meals?: Yes    Do your own shopping?: Yes    Previous Hospitalizations:   Any hospitalizations or ED visits within the last 12 months?: No      Advance Care Planning:   Living will: No    Durable POA for healthcare: No    Advanced directive: No    Advanced directive counseling given: Yes      Cognitive Screening:   Provider or family/friend/caregiver concerned regarding cognition?: No    PREVENTIVE SCREENINGS      Cardiovascular Screening:    General: Screening Current, Risks and Benefits Discussed and History Lipid Disorder    Due for: Lipid Panel      Diabetes Screening:     General: Risks and Benefits Discussed and Screening Current      Colorectal Cancer Screening:     General: Risks and Benefits Discussed and Screening Current      Breast Cancer Screening:     General: Screening Current and Risks and Benefits Discussed    Due for: Mammogram        Cervical Cancer Screening:    General: Screening Not Indicated and Risks and Benefits Discussed      Osteoporosis Screening:    General: History Osteoporosis, Risks and Benefits Discussed and Screening Current    Due for: Bone Density CT Axial      Abdominal Aortic Aneurysm (AAA) Screening:        General: Risks and Benefits Discussed and Screening Not Indicated      Lung Cancer Screening:     General: Screening Not Indicated and Risks and Benefits Discussed      Hepatitis C Screening:    General: Screening Current    Screening, Brief Intervention, and Referral to Treatment (SBIRT)    Screening  Typical number of drinks in a day: 0  Typical number of drinks in a week: 0  Interpretation: Low risk drinking behavior  Single Item Drug Screening:  How often have you used an illegal drug (including marijuana) or a prescription medication for non-medical reasons in the past year? never    Single Item Drug Screen Score: 0  Interpretation: Negative screen for possible drug use disorder    Brief Intervention  Alcohol & drug use screenings were reviewed  No concerns regarding substance use disorder identified  Review of Current Opioid Use    Opioid Risk Tool (ORT) Interpretation: Complete Opioid Risk Tool (ORT)    Other Counseling Topics:   Car/seat belt/driving safety, skin self-exam, sunscreen and regular weightbearing exercise and calcium and vitamin D intake         Annabelle Argueta DO

## 2021-06-22 NOTE — PATIENT INSTRUCTIONS
Medicare Preventive Visit Patient Instructions  Thank you for completing your Welcome to Medicare Visit or Medicare Annual Wellness Visit today  Your next wellness visit will be due in one year (6/23/2022)  The screening/preventive services that you may require over the next 5-10 years are detailed below  Some tests may not apply to you based off risk factors and/or age  Screening tests ordered at today's visit but not completed yet may show as past due  Also, please note that scanned in results may not display below  Preventive Screenings:  Service Recommendations Previous Testing/Comments   Colorectal Cancer Screening  * Colonoscopy    * Fecal Occult Blood Test (FOBT)/Fecal Immunochemical Test (FIT)  * Fecal DNA/Cologuard Test  * Flexible Sigmoidoscopy Age: 54-65 years old   Colonoscopy: every 10 years (may be performed more frequently if at higher risk)  OR  FOBT/FIT: every 1 year  OR  Cologuard: every 3 years  OR  Sigmoidoscopy: every 5 years  Screening may be recommended earlier than age 48 if at higher risk for colorectal cancer  Also, an individualized decision between you and your healthcare provider will decide whether screening between the ages of 74-80 would be appropriate  Colonoscopy: Not on file  FOBT/FIT: Not on file  Cologuard: Not on file  Sigmoidoscopy: Not on file          Breast Cancer Screening Age: 36 years old  Frequency: every 1-2 years  Not required if history of left and right mastectomy Mammogram: 06/29/2020    Screening Current   Cervical Cancer Screening Between the ages of 21-29, pap smear recommended once every 3 years  Between the ages of 33-67, can perform pap smear with HPV co-testing every 5 years     Recommendations may differ for women with a history of total hysterectomy, cervical cancer, or abnormal pap smears in past  Pap Smear: 06/01/2021    Screening Not Indicated   Hepatitis C Screening Once for adults born between 1945 and 1965  More frequently in patients at high risk for Hepatitis C Hep C Antibody: 06/12/2020    Screening Current   Diabetes Screening 1-2 times per year if you're at risk for diabetes or have pre-diabetes Fasting glucose: No results in last 5 years   A1C: No results in last 5 years        Cholesterol Screening Once every 5 years if you don't have a lipid disorder  May order more often based on risk factors  Lipid panel: 06/12/2020    Screening Current     Other Preventive Screenings Covered by Medicare:  1  Abdominal Aortic Aneurysm (AAA) Screening: covered once if your at risk  You're considered to be at risk if you have a family history of AAA  2  Lung Cancer Screening: covers low dose CT scan once per year if you meet all of the following conditions: (1) Age 50-69; (2) No signs or symptoms of lung cancer; (3) Current smoker or have quit smoking within the last 15 years; (4) You have a tobacco smoking history of at least 30 pack years (packs per day multiplied by number of years you smoked); (5) You get a written order from a healthcare provider  3  Glaucoma Screening: covered annually if you're considered high risk: (1) You have diabetes OR (2) Family history of glaucoma OR (3)  aged 48 and older OR (3)  American aged 72 and older  3  Osteoporosis Screening: covered every 2 years if you meet one of the following conditions: (1) You're estrogen deficient and at risk for osteoporosis based off medical history and other findings; (2) Have a vertebral abnormality; (3) On glucocorticoid therapy for more than 3 months; (4) Have primary hyperparathyroidism; (5) On osteoporosis medications and need to assess response to drug therapy  · Last bone density test (DXA Scan): Not on file  5  HIV Screening: covered annually if you're between the age of 12-76  Also covered annually if you are younger than 13 and older than 72 with risk factors for HIV infection   For pregnant patients, it is covered up to 3 times per pregnancy  Immunizations:  Immunization Recommendations   Influenza Vaccine Annual influenza vaccination during flu season is recommended for all persons aged >= 6 months who do not have contraindications   Pneumococcal Vaccine (Prevnar and Pneumovax)  * Prevnar = PCV13  * Pneumovax = PPSV23   Adults 25-60 years old: 1-3 doses may be recommended based on certain risk factors  Adults 72 years old: Prevnar (PCV13) vaccine recommended followed by Pneumovax (PPSV23) vaccine  If already received PPSV23 since turning 65, then PCV13 recommended at least one year after PPSV23 dose  Hepatitis B Vaccine 3 dose series if at intermediate or high risk (ex: diabetes, end stage renal disease, liver disease)   Tetanus (Td) Vaccine - COST NOT COVERED BY MEDICARE PART B Following completion of primary series, a booster dose should be given every 10 years to maintain immunity against tetanus  Td may also be given as tetanus wound prophylaxis  Tdap Vaccine - COST NOT COVERED BY MEDICARE PART B Recommended at least once for all adults  For pregnant patients, recommended with each pregnancy  Shingles Vaccine (Shingrix) - COST NOT COVERED BY MEDICARE PART B  2 shot series recommended in those aged 48 and above     Health Maintenance Due:      Topic Date Due    Colorectal Cancer Screening  Never done    MAMMOGRAM  06/29/2021    Cervical Cancer Screening  06/01/2026    Hepatitis C Screening  Completed     Immunizations Due:      Topic Date Due    DTaP,Tdap,and Td Vaccines (1 - Tdap) Never done    Pneumococcal Vaccine: 65+ Years (2 of 4 - PPSV23) 04/22/2018     Advance Directives   What are advance directives? Advance directives are legal documents that state your wishes and plans for medical care  These plans are made ahead of time in case you lose your ability to make decisions for yourself  Advance directives can apply to any medical decision, such as the treatments you want, and if you want to donate organs     What are the types of advance directives? There are many types of advance directives, and each state has rules about how to use them  You may choose a combination of any of the following:  · Living will: This is a written record of the treatment you want  You can also choose which treatments you do not want, which to limit, and which to stop at a certain time  This includes surgery, medicine, IV fluid, and tube feedings  · Durable power of  for healthcare [facility]): This is a written record that states who you want to make healthcare choices for you when you are unable to make them for yourself  This person, called a proxy, is usually a family member or a friend  You may choose more than 1 proxy  · Do not resuscitate (DNR) order:  A DNR order is used in case your heart stops beating or you stop breathing  It is a request not to have certain forms of treatment, such as CPR  A DNR order may be included in other types of advance directives  · Medical directive: This covers the care that you want if you are in a coma, near death, or unable to make decisions for yourself  You can list the treatments you want for each condition  Treatment may include pain medicine, surgery, blood transfusions, dialysis, IV or tube feedings, and a ventilator (breathing machine)  · Values history: This document has questions about your views, beliefs, and how you feel and think about life  This information can help others choose the care that you would choose  Why are advance directives important? An advance directive helps you control your care  Although spoken wishes may be used, it is better to have your wishes written down  Spoken wishes can be misunderstood, or not followed  Treatments may be given even if you do not want them  An advance directive may make it easier for your family to make difficult choices about your care     Narcotic (Opioid) Safety    Use narcotics safely:  · Take prescribed narcotics exactly as directed  · Do not give narcotics to others or take narcotics that belong to someone else  · Do not mix narcotics without medicines or alcohol  · Do not drive or operate heavy machinery after you take the narcotic  · Monitor for side effects and notify your healthcare provider if you experienced side effects such as nausea, sleepiness, itching, or trouble thinking clearly  Manage constipation:    Constipation is the most common side effect of narcotic medicine  Constipation is when you have hard, dry bowel movements, or you go longer than usual between bowel movements  Tell your healthcare provider about all changes in your bowel movements while you are taking narcotics  He or she may recommend laxative medicine to help you have a bowel movement  He or she may also change the kind of narcotic you are taking, or change when you take it  The following are more ways you can prevent or relieve constipation:    · Drink liquids as directed  You may need to drink extra liquids to help soften and move your bowels  Ask how much liquid to drink each day and which liquids are best for you  · Eat high-fiber foods  This may help decrease constipation by adding bulk to your bowel movements  High-fiber foods include fruits, vegetables, whole-grain breads and cereals, and beans  Your healthcare provider or dietitian can help you create a high-fiber meal plan  Your provider may also recommend a fiber supplement if you cannot get enough fiber from food  · Exercise regularly  Regular physical activity can help stimulate your intestines  Walking is a good exercise to prevent or relieve constipation  Ask which exercises are best for you  · Schedule a time each day to have a bowel movement  This may help train your body to have regular bowel movements  Bend forward while you are on the toilet to help move the bowel movement out  Sit on the toilet for at least 10 minutes, even if you do not have a bowel movement      Store narcotics safely:   · Store narcotics where others cannot easily get them  Keep them in a locked cabinet or secure area  Do not  keep them in a purse or other bag you carry with you  A person may be looking for something else and find the narcotics  · Make sure narcotics are stored out of the reach of children  A child can easily overdose on narcotics  Narcotics may look like candy to a small child  The best way to dispose of narcotics: The laws vary by country and area  In the [place], the best way is to return the narcotics through a take-back program  This program is offered by the [employer] ([employer])  The following are options for using the program:  · Take the narcotics to a PHONG collection site  The site is often a law enforcement center  Call your local law enforcement center for scheduled take-back days in your area  You will be given information on where to go if the collection site is in a different location  · Take the narcotics to an approved pharmacy or hospital   A pharmacy or hospital may be set up as a collection site  You will need to ask if it is a PHONG collection site if you were not directed there  A pharmacy or doctor's office may not be able to take back narcotics unless it is a PHONG site  · Use a mail-back system  This means you are given containers to put the narcotics into  You will then mail them in the containers  · Use a take-back drop box  This is a place to leave the narcotics at any time  People and animals will not be able to get into the box  Your local law enforcement agency can tell you where to find a drop box in your area  Other ways to manage pain:   · Ask your healthcare provider about non-narcotic medicines to control pain  Nonprescription medicines include NSAIDs (such as ibuprofen) and acetaminophen  Prescription medicines include muscle relaxers, antidepressants, and steroids  · Pain may be managed without any medicines    Some ways to relieve pain include massage, aromatherapy, or meditation  Physical or occupational therapy may also help  For more information:   · Drug Enforcement Administration  [address]  Phone: 8- 063 - 397-7219  Web Address: [facility]/drug_disposal/    · [address] and Drug Administration  [place] , [address]  Phone: 9- 967 - 689-8535  Web Address: http://[employer]/     © Copyright [employer] 2018 Information is for End User's use only and may not be sold, redistributed or otherwise used for commercial purposes   All illustrations and images included in CareNotes® are the copyrighted property of A D A M , Inc  or [facility] Roya Moss

## 2021-06-22 NOTE — PROGRESS NOTES
Assessment/Plan:   patient received Prevnar 13 vaccine today  Patient given lab requisition for fasting lipid panel, TSH and vitamin-D  Patient was started on a Z-Bang and may take Mucinex and use saline nasal spray p r n  Recommend increase fluids and rest   Patient to continue present treatment  Patient to follow up with specialists as scheduled and return to the office in 1 year  Diagnoses and all orders for this visit:    Hypothyroidism, unspecified type  -     TSH, 3rd generation with Free T4 reflex; Future    Acute maxillary sinusitis, recurrence not specified  -     azithromycin (ZITHROMAX) 250 mg tablet; Take 2 tablets today then 1 tablet daily x 4 days    Medicare annual wellness visit, subsequent    Hypercholesteremia  -     Lipid panel; Future    Rheumatoid arthritis involving multiple sites, unspecified whether rheumatoid factor present (HCC)    Vitamin D deficiency  -     Vitamin D 25 hydroxy; Future    Need for pneumococcal vaccine  -     PNEUMOCOCCAL CONJUGATE VACCINE 13-VALENT GREATER THAN 6 MONTHS    Encounter for colorectal cancer screening  -     Cologuard; Future    Other orders  -     desonide (DESOWEN) 0 05 % cream; desonide 0 05 % topical cream   APPLY TO AFFECTED AREAS OF THE BODY TWICE A DAY FOR 2 WEEKS AS NEEDED FOR FLARES **NOT COVERED**  -     ketorolac (TORADOL) 10 mg tablet; Take 10 mg by mouth Three times a day          Subjective:      Patient ID: Lola Marinelli is a 76 y o  female  Patient is here for follow-up appointment chronic conditions and annual Medicare wellness exam   She is not fasting today  Patient gets labs ordered routinely by her rheumatologist   Patient has been feeling fairly well overall although recently complains of sinus infection  Patient is up-to-date on colonoscopy, gyn exam and mammogram   She is  scheduled for follow-up DEXA scan later this year  Thyroid Problem  Presents for follow-up visit  Symptoms include dry skin and fatigue   Patient reports no anxiety, cold intolerance, constipation, depressed mood, diaphoresis, diarrhea, hair loss, heat intolerance, hoarse voice, leg swelling, palpitations, tremors, visual change, weight gain or weight loss  The symptoms have been stable  Sinusitis  This is a new problem  The current episode started 1 to 4 weeks ago  The problem has been gradually worsening since onset  There has been no fever  Associated symptoms include congestion, headaches, sinus pressure and sneezing  Pertinent negatives include no chills, coughing, diaphoresis, ear pain, hoarse voice, shortness of breath, sore throat or swollen glands  Treatments tried: xyzal  The treatment provided mild relief  The following portions of the patient's history were reviewed and updated as appropriate: allergies, current medications, past family history, past medical history, past social history, past surgical history and problem list     Review of Systems   Constitutional: Positive for fatigue  Negative for chills, diaphoresis, weight gain and weight loss  HENT: Positive for congestion, sinus pressure and sneezing  Negative for ear pain, hoarse voice and sore throat  Respiratory: Negative for cough and shortness of breath  Cardiovascular: Negative for palpitations  Gastrointestinal: Negative for constipation and diarrhea  Endocrine: Negative for cold intolerance and heat intolerance  Neurological: Positive for headaches  Negative for tremors  Psychiatric/Behavioral: The patient is not nervous/anxious  Objective:      /80   Pulse 84   Temp 97 5 °F (36 4 °C) (Temporal)   Resp 16   Ht 5' 5" (1 651 m)   Wt 55 8 kg (123 lb)   LMP 05/18/2006   SpO2 98%   BMI 20 47 kg/m²          Physical Exam  Constitutional:       General: She is not in acute distress  Appearance: Normal appearance  She is not ill-appearing, toxic-appearing or diaphoretic  HENT:      Head: Normocephalic        Right Ear: Tympanic membrane normal       Left Ear: Tympanic membrane normal       Nose: Congestion present  Mouth/Throat:      Mouth: Mucous membranes are moist       Pharynx: Oropharynx is clear  Eyes:      General: No scleral icterus  Conjunctiva/sclera: Conjunctivae normal    Neck:      Vascular: No carotid bruit  Cardiovascular:      Rate and Rhythm: Normal rate and regular rhythm  Pulmonary:      Effort: Pulmonary effort is normal       Breath sounds: Normal breath sounds  Abdominal:      Palpations: Abdomen is soft  Tenderness: There is no abdominal tenderness  Musculoskeletal:      Cervical back: Neck supple  Right lower leg: No edema  Left lower leg: No edema  Lymphadenopathy:      Cervical: No cervical adenopathy  Skin:     General: Skin is warm and dry  Neurological:      General: No focal deficit present  Mental Status: She is alert and oriented to person, place, and time  Psychiatric:         Mood and Affect: Mood normal          Behavior: Behavior normal          Thought Content:  Thought content normal          Judgment: Judgment normal

## 2021-06-23 RX ORDER — LEVOTHYROXINE SODIUM 50 MCG
TABLET ORAL
Qty: 90 TABLET | Refills: 3 | Status: SHIPPED | OUTPATIENT
Start: 2021-06-23 | End: 2022-05-28

## 2021-07-07 ENCOUNTER — TELEPHONE (OUTPATIENT)
Dept: ADMINISTRATIVE | Facility: OTHER | Age: 66
End: 2021-07-07

## 2021-07-07 NOTE — TELEPHONE ENCOUNTER
----- Message from Vicky Brown sent at 7/6/2021  8:53 AM EDT -----  07/06/21 8:54 AM    Hello, our patient Falguni Stokes has had CRC: Colonoscopy completed/performed  Please assist in updating the patient chart by pulling the Care Everywhere (CE) document  The date of service is 02/15/21       Thank you,  Vicky Brown  [facility]

## 2021-07-07 NOTE — TELEPHONE ENCOUNTER
Upon review of the In Basket request we were able to locate, review, and update the patient chart as requested for CRC: Colonoscopy  Any additional questions or concerns should be emailed to the Practice Liaisons via [email or web address] email, please do not reply via In Basket      Thank you  Montse Demarco

## 2021-07-09 ENCOUNTER — TELEPHONE (OUTPATIENT)
Dept: OBGYN CLINIC | Facility: MEDICAL CENTER | Age: 66
End: 2021-07-09

## 2021-08-11 ENCOUNTER — HOSPITAL ENCOUNTER (OUTPATIENT)
Dept: MAMMOGRAPHY | Facility: MEDICAL CENTER | Age: 66
Discharge: HOME | End: 2021-08-11
Payer: COMMERCIAL

## 2021-08-11 ENCOUNTER — HOSPITAL ENCOUNTER (OUTPATIENT)
Dept: BONE DENSITY | Facility: MEDICAL CENTER | Age: 66
Discharge: HOME | End: 2021-08-11
Payer: COMMERCIAL

## 2021-08-11 VITALS — WEIGHT: 123 LBS | BODY MASS INDEX: 21.79 KG/M2 | HEIGHT: 63 IN

## 2021-08-11 DIAGNOSIS — Z12.31 ENCOUNTER FOR SCREENING MAMMOGRAM FOR MALIGNANT NEOPLASM OF BREAST: ICD-10-CM

## 2021-08-11 DIAGNOSIS — Z79.52 LONG TERM SYSTEMIC STEROID USER: ICD-10-CM

## 2021-08-11 DIAGNOSIS — R92.2 DENSE BREAST TISSUE ON MAMMOGRAM: ICD-10-CM

## 2021-08-11 DIAGNOSIS — M81.0 OSTEOPOROSIS WITHOUT CURRENT PATHOLOGICAL FRACTURE, UNSPECIFIED OSTEOPOROSIS TYPE: ICD-10-CM

## 2021-08-11 PROCEDURE — 77067 SCR MAMMO BI INCL CAD: CPT

## 2021-08-11 PROCEDURE — 77080 DXA BONE DENSITY AXIAL: CPT

## 2021-08-11 PROCEDURE — 77063 BREAST TOMOSYNTHESIS BI: CPT

## 2021-08-19 ENCOUNTER — TELEPHONE (OUTPATIENT)
Dept: OBGYN CLINIC | Facility: MEDICAL CENTER | Age: 66
End: 2021-08-19

## 2021-08-19 NOTE — TELEPHONE ENCOUNTER
-- DO NOT REPLY / DO NOT REPLY ALL --  -- Message is from the [facility]--    COVID-19 Universal Screening Responses:    Shortness of Breath or Difficulty Completing a Sentence: YES  Fever, Cough or Other Respiratory: YES  Close Contact, Exposure or Travel to Identified Countries: NO    General Patient Message      Reason for Call: Patient is requesting a prescription for cough syrup, she prefers the one she was previously prescribed promethazine-dextromethorphan. She also is requesting to a work clearance form for her job as they're requesting one and states she cannot return to work while showing the symptoms.    Patient initially called in to go to Urgent care at [facility] however due to her symptoms she is unable to be routed there and is set for a call back from a nurse.     Caller Information       Type Contact Phone    03/21/2020 11:03 AM Phone (Incoming) Mic Sheffield (Self) [phone] (H)          Alternative phone number: None    Turnaround time given to caller:   \"This message will be sent to [state Provider's name]. The clinical team will fulfill your request as soon as they review your message when the office opens on Monday (or after the holiday).\"       Pt made aware of DXA results

## 2021-11-17 ENCOUNTER — VBI (OUTPATIENT)
Dept: ADMINISTRATIVE | Facility: OTHER | Age: 66
End: 2021-11-17

## 2022-03-14 ENCOUNTER — TELEPHONE (OUTPATIENT)
Dept: OTHER | Facility: OTHER | Age: 67
End: 2022-03-14

## 2022-03-31 PROBLEM — K25.9 GASTRIC ULCER: Status: ACTIVE | Noted: 2022-03-31

## 2022-05-28 DIAGNOSIS — E03.9 HYPOTHYROIDISM, UNSPECIFIED TYPE: ICD-10-CM

## 2022-05-28 RX ORDER — LEVOTHYROXINE SODIUM 50 MCG
TABLET ORAL
Qty: 90 TABLET | Refills: 3 | Status: SHIPPED | OUTPATIENT
Start: 2022-05-28

## 2022-07-05 ENCOUNTER — OFFICE VISIT (OUTPATIENT)
Dept: FAMILY MEDICINE CLINIC | Facility: CLINIC | Age: 67
End: 2022-07-05
Payer: COMMERCIAL

## 2022-07-05 VITALS
OXYGEN SATURATION: 99 % | TEMPERATURE: 98 F | RESPIRATION RATE: 16 BRPM | DIASTOLIC BLOOD PRESSURE: 72 MMHG | WEIGHT: 122 LBS | SYSTOLIC BLOOD PRESSURE: 116 MMHG | HEIGHT: 63 IN | BODY MASS INDEX: 21.62 KG/M2 | HEART RATE: 88 BPM

## 2022-07-05 DIAGNOSIS — E03.9 HYPOTHYROIDISM, UNSPECIFIED TYPE: Primary | ICD-10-CM

## 2022-07-05 DIAGNOSIS — M06.9 RHEUMATOID ARTHRITIS (HCC): ICD-10-CM

## 2022-07-05 DIAGNOSIS — E78.00 HYPERCHOLESTEROLEMIA: ICD-10-CM

## 2022-07-05 DIAGNOSIS — E55.9 VITAMIN D DEFICIENCY: ICD-10-CM

## 2022-07-05 DIAGNOSIS — Z00.00 MEDICARE ANNUAL WELLNESS VISIT, SUBSEQUENT: ICD-10-CM

## 2022-07-05 DIAGNOSIS — F11.20 CONTINUOUS OPIOID DEPENDENCE (HCC): ICD-10-CM

## 2022-07-05 PROCEDURE — 99214 OFFICE O/P EST MOD 30 MIN: CPT | Performed by: FAMILY MEDICINE

## 2022-07-05 PROCEDURE — G0439 PPPS, SUBSEQ VISIT: HCPCS | Performed by: FAMILY MEDICINE

## 2022-07-05 RX ORDER — ADALIMUMAB 40MG/0.4ML
0.4 KIT SUBCUTANEOUS
COMMUNITY

## 2022-07-05 RX ORDER — PANTOPRAZOLE SODIUM 40 MG/1
TABLET, DELAYED RELEASE ORAL
COMMUNITY
Start: 2022-03-20

## 2022-07-05 RX ORDER — METHOTREXATE 25 MG/ML
INJECTION, SOLUTION INTRA-ARTERIAL; INTRAMUSCULAR; INTRAVENOUS
COMMUNITY
Start: 2022-06-11

## 2022-07-05 RX ORDER — CYCLOBENZAPRINE HCL 10 MG
10 TABLET ORAL
Qty: 90 TABLET | Refills: 1 | Status: SHIPPED | OUTPATIENT
Start: 2022-07-05

## 2022-07-05 NOTE — PROGRESS NOTES
Assessment and Plan:     Problem List Items Addressed This Visit    None         Depression Screening and Follow-up Plan: Patient was screened for depression during today's encounter  They screened negative with a PHQ-2 score of 0  Preventive health issues were discussed with patient, and age appropriate screening tests were ordered as noted in patient's After Visit Summary  Personalized health advice and appropriate referrals for health education or preventive services given if needed, as noted in patient's After Visit Summary  History of Present Illness:     Patient presents for a Medicare Wellness Visit    HPI   Patient Care Team:  Nanci Mendoza DO as PCP - General  Nanci Mendoza DO as PCP - [address] [place] [address],[address] ([place])  Haris Rmoan DO     Review of Systems:     Review of Systems     Problem List:     Patient Active Problem List   Diagnosis    Abnormal finding on mammography    Allergic rhinitis    Hypothyroidism    Rheumatoid arthritis ([address] )    Rosacea    Unsatisfactory cervical Papanicolaou smear    Vitamin D deficiency    Gastric ulcer      Past Medical and Surgical History:     Past Medical History:   Diagnosis Date    DVT, lower extremity ([address] )     Right leg- was a smoker and on birth control    Fibromyalgia     History of transfusion     Post-op knee replacement  No adverse reaction    Hypothyroidism     Mass     Left foot    PONV (postoperative nausea and vomiting)     RA (rheumatoid arthritis) ([address] )     Wears glasses     For reading     Past Surgical History:   Procedure Laterality Date     SECTION      x1    COLONOSCOPY  ary     CYST REMOVAL Left     excision - Baker's cyst - knee     FRACTURE SURGERY Right     Right ankle is fused  Has screws    JOINT REPLACEMENT Bilateral     TKR's   Lt- x1; Rt-x1 and then revision    KNEE ARTHROSCOPY Bilateral     LA EXC TUMOR SOFT TISSUE FOOT/TOE SUBFASC <1 5CM Left 10/30/2018    Procedure: FOOT SOFT TISSUE MASS EXCISION;  Surgeon: Rell Waddell DPM;  Location: [facility];  Service: Podiatry    TUBAL LIGATION      WISDOM TOOTH EXTRACTION        Family History:     Family History   Problem Relation Age of Onset    Diabetes Mother     Hypertension Mother     Hypothyroidism Mother     Coronary artery disease Father     No Known Problems Daughter     No Known Problems Maternal Grandmother     Liver cancer Maternal Grandfather 59    No Known Problems Paternal Grandmother     Throat cancer Paternal Grandfather 79    No Known Problems Daughter     No Known Problems Paternal Aunt     No Known Problems Paternal Aunt     No Known Problems Paternal Aunt     No Known Problems Paternal Aunt     No Known Problems Paternal Aunt       Social History:     Social History     Socioeconomic History    Marital status: /Civil Union     Spouse name: Not on file    Number of children: Not on file    Years of education: Not on file    Highest education level: Not on file   Occupational History    Not on file   Tobacco Use    Smoking status: Former Smoker     Packs/day:      Years: 10 00     Pack years: 10 00     Types: Cigarettes     Quit date:      Years since quittin 5    Smokeless tobacco: Never Used   Vaping Use    Vaping Use: Never used   Substance and Sexual Activity    Alcohol use: Yes     Comment: 1 or 2 weekly    Drug use: No    Sexual activity: Yes     Partners: Male     Birth control/protection: Female Sterilization   Other Topics Concern    Not on file   Social History Narrative    Not on file     Social Determinants of Health     Financial Resource Strain: Not on file   Food Insecurity: Not on file   Transportation Needs: Not on file   Physical Activity: Not on file   Stress: Not on file   Social Connections: Not on file   Intimate Partner Violence: Not on file   Housing Stability: Not on file      Medications and Allergies:     Current Outpatient Medications   Medication Sig Dispense Refill    pantoprazole (PROTONIX) 40 mg tablet pantoprazole 40 mg tablet,delayed release      ACTEMRA 162 MG/0 9ML SOSY       Adalimumab (Humira) 40 MG/0 4ML PSKT 0 4 mL      Ascorbic Acid (VITAMIN C) 1000 MG tablet Take 1,000 mg by mouth daily      Biotin 88486 MCG TABS Take by mouth      Cholecalciferol 2000 units CAPS Take 1 capsule by mouth      cyclobenzaprine (FLEXERIL) 10 mg tablet TAKE 1 TABLET BY MOUTH DAILY AT BEDTIME 90 tablet 1    cycloSPORINE (RESTASIS) 0 05 % ophthalmic emulsion Apply 1 drop to eye      desonide (DESOWEN) 0 05 % cream desonide 0 05 % topical cream   APPLY TO AFFECTED AREAS OF THE BODY TWICE A DAY FOR 2 WEEKS AS NEEDED FOR FLARES **NOT COVERED**      folic acid (FOLVITE) 1 mg tablet Take 1 tablet by mouth daily      hydrochlorothiazide (HYDRODIURIL) 25 mg tablet Take 1 tablet by mouth daily as needed      hydrocortisone 2 5 % cream hydrocortisone 2 5 % topical cream   APPLY TWICE DAILY TO THE AFFECTED AREAS FOR TWO WEEKS, REPEAT IN 2 WEEKS AS NEEDED      ketorolac (TORADOL) 10 mg tablet Take 10 mg by mouth Three times a day      Methotrexate 20 MG/0 8ML SOSY 0 8 mL      methotrexate 25 MG/ML Inject as directed Weekly- 0 6 ml       Multiple Vitamins-Minerals (HAIR/SKIN/NAILS/BIOTIN) TABS Take 1 tablet by mouth daily      Naproxen Sodium (ALEVE) 220 MG CAPS Take by mouth as needed        oxyCODONE-acetaminophen (PERCOCET) 5-325 mg per tablet Take 1 tablet by mouth every 4 (four) hours as needed       predniSONE 5 mg tablet Take 5 mg by mouth as needed  (Patient not taking: Reported on 6/22/2021)  2    Synthroid 50 MCG tablet TAKE 1 TABLET BY MOUTH  DAILY 90 tablet 3    triamcinolone (KENALOG) 0 5 % ointment Apply topically 2 (two) times a day 15 g 3     No current facility-administered medications for this visit  Allergies   Allergen Reactions    Cephalexin Rash    Nuts - Food Allergy Hives and Swelling     Swelling in throat    Poppy seeds also cause this reaction    Papaver Wheezing    Erythromycin GI Intolerance    Penicillins Rash     Pt unsure of how bad reaction was-  Occurred in childhood    Ciprofloxacin Rash      Immunizations:     Immunization History   Administered Date(s) Administered    COVID-19 MODERNA VACC 0 5 ML IM 03/20/2021, 04/17/2021    H1N1, All Formulations 11/20/2009    INFLUENZA 11/07/2020    Influenza, injectable, quadrivalent, preservative free 0 5 mL 09/23/2018, 10/12/2019    Influenza, seasonal, injectable 11/01/2015    Pneumococcal Conjugate 13-Valent 06/22/2021    Pneumococcal Polysaccharide PPV23 04/22/2013    Zoster Vaccine Recombinant 09/23/2018      Health Maintenance:         Topic Date Due    Breast Cancer Screening: Mammogram  08/11/2022    Cervical Cancer Screening  06/01/2026    Colorectal Cancer Screening  02/15/2031    Hepatitis C Screening  Completed         Topic Date Due    DTaP,Tdap,and Td Vaccines (1 - Tdap) Never done    COVID-19 Vaccine (3 - Moderna risk series) 05/15/2021    Pneumococcal Vaccine: 65+ Years (3 - PPSV23 or PCV20) 06/22/2022    Influenza Vaccine (1) 09/01/2022      Medicare Screening Tests and Risk Assessments:     Kelvin Davis is here for her Subsequent Wellness visit  Health Risk Assessment:   Patient rates overall health as good  Patient feels that their physical health rating is same  Patient is satisfied with their life  Eyesight was rated as same  Hearing was rated as same  Patient feels that their emotional and mental health rating is same  Patients states they are never, rarely angry  Patient states they are sometimes unusually tired/fatigued  Pain experienced in the last 7 days has been some  Patient's pain rating has been 6/10  Patient states that she has experienced no weight loss or gain in last 6 months  Depression Screening:   PHQ-2 Score: 0      Fall Risk Screening:    In the past year, patient has experienced: no history of falling in past year      Urinary Incontinence Screening:   Patient has not leaked urine accidently in the last six months  Home Safety:  Patient has trouble with stairs inside or outside of their home  Patient has working smoke alarms and has working carbon monoxide detector  Home safety hazards include: none  Nutrition:   Current diet is Regular  Medications:   Patient is currently taking over-the-counter supplements  OTC medications include: VitaminD, Calcium, Multivitamin, Biotin  Patient is able to manage medications  Activities of Daily Living (ADLs)/Instrumental Activities of Daily Living (IADLs):   Walk and transfer into and out of bed and chair?: Yes  Dress and groom yourself?: Yes    Bathe or shower yourself?: Yes    Feed yourself? Yes  Do your laundry/housekeeping?: Yes  Manage your money, pay your bills and track your expenses?: Yes  Make your own meals?: Yes    Do your own shopping?: Yes    Previous Hospitalizations:   Any hospitalizations or ED visits within the last 12 months?: Yes    How many hospitalizations have you had in the last year?: 1-2    Hospitalization Comments: PUD, surgery    Advance Care Planning:   Living will: Yes    Durable POA for healthcare:  Yes    Advanced directive: Yes      Cognitive Screening:   Provider or family/friend/caregiver concerned regarding cognition?: No    PREVENTIVE SCREENINGS      Cardiovascular Screening:    General: Screening Current and Risks and Benefits Discussed    Due for: Lipid Panel      Diabetes Screening:     General: Risks and Benefits Discussed and Screening Current      Colorectal Cancer Screening:     General: Screening Current      Breast Cancer Screening:     General: Screening Current and Risks and Benefits Discussed      Cervical Cancer Screening:    General: Screening Not Indicated and Risks and Benefits Discussed      Osteoporosis Screening:    General: Screening Not Indicated, History Osteoporosis and Risks and Benefits Discussed      Abdominal Aortic Aneurysm (AAA) Screening:        General: Risks and Benefits Discussed and Screening Not Indicated      Lung Cancer Screening:     General: Screening Not Indicated and Risks and Benefits Discussed      Hepatitis C Screening:    General: Screening Current    Screening, Brief Intervention, and Referral to Treatment (SBIRT)    Screening  Typical number of drinks in a day: 0  Typical number of drinks in a week: 0  Interpretation: Low risk drinking behavior  AUDIT-C Screenin) How often did you have a drink containing alcohol in the past year? monthly or less  2) How many drinks did you have on a typical day when you were drinking in the past year? 1 to 2  3) How often did you have 6 or more drinks on one occasion in the past year? never    AUDIT-C Score: 1  Interpretation: Score 0-2 (female): Negative screen for alcohol misuse    Single Item Drug Screening:  How often have you used an illegal drug (including marijuana) or a prescription medication for non-medical reasons in the past year? never    Single Item Drug Screen Score: 0  Interpretation: Negative screen for possible drug use disorder    Brief Intervention  Alcohol & drug use screenings were reviewed  No concerns regarding substance use disorder identified  Review of Current Opioid Use    Opioid Risk Tool (ORT) Interpretation: Complete Opioid Risk Tool (ORT)    Other Counseling Topics:   Car/seat belt/driving safety, skin self-exam, sunscreen and calcium and vitamin D intake and regular weightbearing exercise       No exam data present     Physical Exam:     LMP 2006     Physical Exam     Carlos Lucas DO

## 2022-07-05 NOTE — PATIENT INSTRUCTIONS
Medicare Preventive Visit Patient Instructions  Thank you for completing your Welcome to Medicare Visit or Medicare Annual Wellness Visit today  Your next wellness visit will be due in one year (7/6/2023)  The screening/preventive services that you may require over the next 5-10 years are detailed below  Some tests may not apply to you based off risk factors and/or age  Screening tests ordered at today's visit but not completed yet may show as past due  Also, please note that scanned in results may not display below  Preventive Screenings:  Service Recommendations Previous Testing/Comments   Colorectal Cancer Screening  * Colonoscopy    * Fecal Occult Blood Test (FOBT)/Fecal Immunochemical Test (FIT)  * Fecal DNA/Cologuard Test  * Flexible Sigmoidoscopy Age: 54-65 years old   Colonoscopy: every 10 years (may be performed more frequently if at higher risk)  OR  FOBT/FIT: every 1 year  OR  Cologuard: every 3 years  OR  Sigmoidoscopy: every 5 years  Screening may be recommended earlier than age 48 if at higher risk for colorectal cancer  Also, an individualized decision between you and your healthcare provider will decide whether screening between the ages of 74-80 would be appropriate  Colonoscopy: 02/15/2021  FOBT/FIT: Not on file  Cologuard: Not on file  Sigmoidoscopy: Not on file          Breast Cancer Screening Age: 36 years old  Frequency: every 1-2 years  Not required if history of left and right mastectomy Mammogram: 08/11/2021        Cervical Cancer Screening Between the ages of 21-29, pap smear recommended once every 3 years  Between the ages of 33-67, can perform pap smear with HPV co-testing every 5 years     Recommendations may differ for women with a history of total hysterectomy, cervical cancer, or abnormal pap smears in past  Pap Smear: 06/01/2021        Hepatitis C Screening Once for adults born between 1945 and 1965  More frequently in patients at high risk for Hepatitis C Hep C Antibody: 06/12/2020        Diabetes Screening 1-2 times per year if you're at risk for diabetes or have pre-diabetes Fasting glucose: No results in last 5 years   A1C: No results in last 5 years        Cholesterol Screening Once every 5 years if you don't have a lipid disorder  May order more often based on risk factors  Lipid panel: 07/20/2021          Other Preventive Screenings Covered by Medicare:  1  Abdominal Aortic Aneurysm (AAA) Screening: covered once if your at risk  You're considered to be at risk if you have a family history of AAA  2  Lung Cancer Screening: covers low dose CT scan once per year if you meet all of the following conditions: (1) Age 50-69; (2) No signs or symptoms of lung cancer; (3) Current smoker or have quit smoking within the last 15 years; (4) You have a tobacco smoking history of at least 30 pack years (packs per day multiplied by number of years you smoked); (5) You get a written order from a healthcare provider  3  Glaucoma Screening: covered annually if you're considered high risk: (1) You have diabetes OR (2) Family history of glaucoma OR (3)  aged 48 and older OR (3)  American aged 72 and older  3  Osteoporosis Screening: covered every 2 years if you meet one of the following conditions: (1) You're estrogen deficient and at risk for osteoporosis based off medical history and other findings; (2) Have a vertebral abnormality; (3) On glucocorticoid therapy for more than 3 months; (4) Have primary hyperparathyroidism; (5) On osteoporosis medications and need to assess response to drug therapy  · Last bone density test (DXA Scan): 08/17/2021   5  HIV Screening: covered annually if you're between the age of 15-65  Also covered annually if you are younger than 13 and older than 72 with risk factors for HIV infection  For pregnant patients, it is covered up to 3 times per pregnancy      Immunizations:  Immunization Recommendations   Influenza Vaccine Annual influenza vaccination during flu season is recommended for all persons aged >= 6 months who do not have contraindications   Pneumococcal Vaccine (Prevnar and Pneumovax)  * Prevnar = PCV13  * Pneumovax = PPSV23   Adults 25-60 years old: 1-3 doses may be recommended based on certain risk factors  Adults 72 years old: Prevnar (PCV13) vaccine recommended followed by Pneumovax (PPSV23) vaccine  If already received PPSV23 since turning 65, then PCV13 recommended at least one year after PPSV23 dose  Hepatitis B Vaccine 3 dose series if at intermediate or high risk (ex: diabetes, end stage renal disease, liver disease)   Tetanus (Td) Vaccine - COST NOT COVERED BY MEDICARE PART B Following completion of primary series, a booster dose should be given every 10 years to maintain immunity against tetanus  Td may also be given as tetanus wound prophylaxis  Tdap Vaccine - COST NOT COVERED BY MEDICARE PART B Recommended at least once for all adults  For pregnant patients, recommended with each pregnancy  Shingles Vaccine (Shingrix) - COST NOT COVERED BY MEDICARE PART B  2 shot series recommended in those aged 48 and above     Health Maintenance Due:      Topic Date Due    Breast Cancer Screening: Mammogram  08/11/2022    Cervical Cancer Screening  06/01/2026    Colorectal Cancer Screening  02/15/2031    Hepatitis C Screening  Completed     Immunizations Due:      Topic Date Due    DTaP,Tdap,and Td Vaccines (1 - Tdap) Never done    COVID-19 Vaccine (3 - Moderna risk series) 05/15/2021    Pneumococcal Vaccine: 65+ Years (3 - PPSV23 or PCV20) 06/22/2022    Influenza Vaccine (1) 09/01/2022     Advance Directives   What are advance directives? Advance directives are legal documents that state your wishes and plans for medical care  These plans are made ahead of time in case you lose your ability to make decisions for yourself   Advance directives can apply to any medical decision, such as the treatments you want, and if you want to donate organs  What are the types of advance directives? There are many types of advance directives, and each state has rules about how to use them  You may choose a combination of any of the following:  · Living will: This is a written record of the treatment you want  You can also choose which treatments you do not want, which to limit, and which to stop at a certain time  This includes surgery, medicine, IV fluid, and tube feedings  · Durable power of  for healthcare [facility]): This is a written record that states who you want to make healthcare choices for you when you are unable to make them for yourself  This person, called a proxy, is usually a family member or a friend  You may choose more than 1 proxy  · Do not resuscitate (DNR) order:  A DNR order is used in case your heart stops beating or you stop breathing  It is a request not to have certain forms of treatment, such as CPR  A DNR order may be included in other types of advance directives  · Medical directive: This covers the care that you want if you are in a coma, near death, or unable to make decisions for yourself  You can list the treatments you want for each condition  Treatment may include pain medicine, surgery, blood transfusions, dialysis, IV or tube feedings, and a ventilator (breathing machine)  · Values history: This document has questions about your views, beliefs, and how you feel and think about life  This information can help others choose the care that you would choose  Why are advance directives important? An advance directive helps you control your care  Although spoken wishes may be used, it is better to have your wishes written down  Spoken wishes can be misunderstood, or not followed  Treatments may be given even if you do not want them  An advance directive may make it easier for your family to make difficult choices about your care     Narcotic (Opioid) Safety    Use narcotics safely:  · Take prescribed narcotics exactly as directed  · Do not give narcotics to others or take narcotics that belong to someone else  · Do not mix narcotics without medicines or alcohol  · Do not drive or operate heavy machinery after you take the narcotic  · Monitor for side effects and notify your healthcare provider if you experienced side effects such as nausea, sleepiness, itching, or trouble thinking clearly  Manage constipation:    Constipation is the most common side effect of narcotic medicine  Constipation is when you have hard, dry bowel movements, or you go longer than usual between bowel movements  Tell your healthcare provider about all changes in your bowel movements while you are taking narcotics  He or she may recommend laxative medicine to help you have a bowel movement  He or she may also change the kind of narcotic you are taking, or change when you take it  The following are more ways you can prevent or relieve constipation:    · Drink liquids as directed  You may need to drink extra liquids to help soften and move your bowels  Ask how much liquid to drink each day and which liquids are best for you  · Eat high-fiber foods  This may help decrease constipation by adding bulk to your bowel movements  High-fiber foods include fruits, vegetables, whole-grain breads and cereals, and beans  Your healthcare provider or dietitian can help you create a high-fiber meal plan  Your provider may also recommend a fiber supplement if you cannot get enough fiber from food  · Exercise regularly  Regular physical activity can help stimulate your intestines  Walking is a good exercise to prevent or relieve constipation  Ask which exercises are best for you  · Schedule a time each day to have a bowel movement  This may help train your body to have regular bowel movements  Bend forward while you are on the toilet to help move the bowel movement out   Sit on the toilet for at least 10 minutes, even if you do not have a bowel movement  Store narcotics safely:   · Store narcotics where others cannot easily get them  Keep them in a locked cabinet or secure area  Do not  keep them in a purse or other bag you carry with you  A person may be looking for something else and find the narcotics  · Make sure narcotics are stored out of the reach of children  A child can easily overdose on narcotics  Narcotics may look like candy to a small child  The best way to dispose of narcotics: The laws vary by country and area  In the [place] [facility], the best way is to return the narcotics through a take-back program  This program is offered by the [employer]  The following are options for using the program:  · Take the narcotics to a [employer]ONG collection site  The site is often a law enforcement center  Call your local law enforcement center for scheduled take-back days in your area  You will be given information on where to go if the collection site is in a different location  · Take the narcotics to an approved pharmacy or hospital   A pharmacy or hospital may be set up as a collection site  You will need to ask if it is a PHONG collection site if you were not directed there  A pharmacy or doctor's office may not be able to take back narcotics unless it is a PHONG site  · Use a mail-back system  This means you are given containers to put the narcotics into  You will then mail them in the containers  · Use a take-back drop box  This is a place to leave the narcotics at any time  People and animals will not be able to get into the box  Your local law enforcement agency can tell you where to find a drop box in your area  Other ways to manage pain:   · Ask your healthcare provider about non-narcotic medicines to control pain  Nonprescription medicines include NSAIDs (such as ibuprofen) and acetaminophen  Prescription medicines include muscle relaxers, antidepressants, and steroids    · Pain may be managed without any medicines  Some ways to relieve pain include massage, aromatherapy, or meditation  Physical or occupational therapy may also help  For more information:   · Drug Enforcement Administration  [address] [facility] [address]  Phone: 5- 568 - [phone]  Web Address: [facility]/drug_disposal/    · [address]  and Drug Administration  [place] [facility] , [address] [facility]  Phone: 6- 385 - [phone]  Web Address: http://[employer]/     © Copyright [employer] 2018 Information is for End User's use only and may not be sold, redistributed or otherwise used for commercial purposes   All illustrations and images included in CareNotes® are the copyrighted property of A D A M , Inc  or [address] [place], [place] [address]tony nicki

## 2022-07-05 NOTE — PROGRESS NOTES
Assessment/Plan:  Patient to complete fasting labs for lipid panel, TSH and vitamin-D level  Patient to continue present treatment  Patient instructed to follow a low-fat and low-cholesterol diet and get regular aerobic exercise including swimming  Follow up with specialists as scheduled return to the office in 1 year  Diagnoses and all orders for this visit:    Hypothyroidism, unspecified type    Medicare annual wellness visit, subsequent    Hypercholesterolemia    Rheumatoid arthritis ([address] )  -     cyclobenzaprine (FLEXERIL) 10 mg tablet; Take 1 tablet (10 mg total) by mouth daily at bedtime    Continuous opioid dependence (HCC)    Vitamin D deficiency    Other orders  -     pantoprazole (PROTONIX) 40 mg tablet; pantoprazole 40 mg tablet,delayed release  -     Methotrexate 20 MG/0 8ML SOSY; 0 8 mL (Patient not taking: Reported on 7/5/2022)  -     hydrocortisone 2 5 % cream; hydrocortisone 2 5 % topical cream   APPLY TWICE DAILY TO THE AFFECTED AREAS FOR TWO WEEKS, REPEAT IN 2 WEEKS AS NEEDED  -     Adalimumab (Humira) 40 MG/0 4ML PSKT; 0 4 mL  -     methotrexate 50 MG/2ML injection; INJECT 0 6 MLS ONCE WEEKLY (Patient not taking: Reported on 7/5/2022)          Subjective:      Patient ID: Francesca Roche is a 78 y o  female  Patient is here for follow-up appoint for chronic conditions and annual Medicare wellness exam   Patient follows with Rheumatology and Pain Management regularly  Patient was hospitalized at [facility] months ago with bleeding peptic ulcer status post gastric surgery  Thyroid Problem  Presents for follow-up visit  Symptoms include constipation, dry skin, hair loss and leg swelling  Patient reports no anxiety, cold intolerance, depressed mood, diaphoresis, diarrhea, fatigue, heat intolerance, hoarse voice, palpitations, tremors, visual change, weight gain or weight loss  The symptoms have been stable         The following portions of the patient's history were reviewed and updated as appropriate: allergies, current medications, past family history, past medical history, past social history, past surgical history and problem list     Review of Systems   Constitutional: Negative for diaphoresis, fatigue, weight gain and weight loss  HENT: Negative for hoarse voice  Cardiovascular: Negative for palpitations  Gastrointestinal: Positive for constipation  Negative for diarrhea  Endocrine: Negative for cold intolerance and heat intolerance  Neurological: Negative for tremors  Psychiatric/Behavioral: The patient is not nervous/anxious  Objective:      /72   Pulse 88   Temp 98 °F (36 7 °C) (Skin)   Resp 16   Ht 5' 3" (1 6 m)   Wt 55 3 kg (122 lb)   LMP 05/18/2006   SpO2 99%   BMI 21 61 kg/m²          Physical Exam  Constitutional:       General: She is not in acute distress  Appearance: Normal appearance  HENT:      Head: Normocephalic  Mouth/Throat:      Mouth: Mucous membranes are moist    Eyes:      General: No scleral icterus  Conjunctiva/sclera: Conjunctivae normal    Neck:      Vascular: No carotid bruit  Cardiovascular:      Rate and Rhythm: Normal rate and regular rhythm  Pulmonary:      Effort: Pulmonary effort is normal       Breath sounds: Normal breath sounds  Abdominal:      Palpations: Abdomen is soft  Tenderness: There is no abdominal tenderness  Musculoskeletal:      Cervical back: Neck supple  Right lower leg: No edema  Left lower leg: No edema  Lymphadenopathy:      Cervical: No cervical adenopathy  Skin:     General: Skin is warm and dry  Neurological:      General: No focal deficit present  Mental Status: She is alert and oriented to person, place, and time  Psychiatric:         Mood and Affect: Mood normal          Behavior: Behavior normal          Thought Content:  Thought content normal          Judgment: Judgment normal          Answers for HPI/ROS submitted by the patient on 7/5/2022  How would you rate your overall health?: good  Compared to last year, how is your physical health?: same  In general, how satisfied are you with your life?: satisfied  Compared to last year, how is your eyesight?: same  Compared to last year, how is your hearing?: same  Compared to last year, how is your emotional/mental health?: same  How often is anger a problem for you?: never, rarely  How often do you feel unusually tired/fatigued?: sometimes  In the past 7 days, how much pain have you experienced?: some  If you answered "some" or "a lot", please rate the severity of your pain on a scale of 1 to 10 (1 being the least severe pain and 10 being the most intense pain)  : 6/10  In the past 6 months, have you lost or gained 10 pounds without trying?: No  One or more falls in the last year: No  In the past 6 months, have you accidentally leaked urine?: No  Do you have trouble with the stairs inside or outside your home?: Yes  Does your home have working smoke alarms?: Yes  Does your home have a carbon monoxide monitor?: Yes  Which safety hazards (if any) have you experienced in your home? Please select all that apply : none  How would you describe your current diet?  Please select all that apply : Regular  In addition to prescription medications, are you taking any over-the-counter supplements?: Yes  If yes, what supplements are you taking?: VitaminD, Calcium, Multivitamin, Biotin  Can you manage your medications?: Yes  Are you currently taking any opioid medications?: Yes  Can you walk and transfer into and out of your bed and chair?: Yes  Can you dress and groom yourself?: Yes  Can you bathe or shower yourself?: Yes  Can you feed yourself?: Yes  Can you do your laundry/ housekeeping?: Yes  Can you manage your money, pay your bills, and track your expenses?: Yes  Can you make your own meals?: Yes  Can you do your own shopping?: Yes  Within the last 12 months, have you had any hospitalizations or Emergency Department visits?: Yes  If yes, how many times have you been hospitalized within the past year?: 1-2  Do you have a living will?: Yes  Do you have a Durable POA ([address]) for healthcare decisions?: Yes  Do you have an Advanced Directive for end of life decisions?: Yes  How often have you used an illegal drug (including marijuana) or a prescription medication for non-medical reasons in the past year?: never  What is the typical number of drinks you consume in a day?: 0  What is the typical number of drinks you consume in a week?: 0  How often did you have a drink containing alcohol in the past year?: monthly or less  How many drinks did you have on a typical day  when you were drinking in the past year?: 1 to 2  How often did you have 6 or more drinks on one occasion in the past year?: never

## 2022-10-03 ENCOUNTER — OFFICE VISIT (OUTPATIENT)
Dept: OBGYN CLINIC | Facility: MEDICAL CENTER | Age: 67
End: 2022-10-03
Payer: COMMERCIAL

## 2022-10-03 VITALS
SYSTOLIC BLOOD PRESSURE: 114 MMHG | WEIGHT: 123 LBS | DIASTOLIC BLOOD PRESSURE: 68 MMHG | BODY MASS INDEX: 21.79 KG/M2 | HEIGHT: 63 IN

## 2022-10-03 DIAGNOSIS — Z12.31 BREAST CANCER SCREENING BY MAMMOGRAM: ICD-10-CM

## 2022-10-03 DIAGNOSIS — Z01.419 WELL WOMAN EXAM WITH ROUTINE GYNECOLOGICAL EXAM: Primary | ICD-10-CM

## 2022-10-03 DIAGNOSIS — L90.0 LICHEN SCLEROSUS ET ATROPHICUS: ICD-10-CM

## 2022-10-03 DIAGNOSIS — Z12.11 COLON CANCER SCREENING: ICD-10-CM

## 2022-10-03 DIAGNOSIS — Z79.52 CURRENT CHRONIC USE OF SYSTEMIC STEROIDS: ICD-10-CM

## 2022-10-03 DIAGNOSIS — R92.2 DENSE BREAST TISSUE ON MAMMOGRAM: ICD-10-CM

## 2022-10-03 DIAGNOSIS — M85.80 OSTEOPENIA, UNSPECIFIED LOCATION: ICD-10-CM

## 2022-10-03 PROCEDURE — G0101 CA SCREEN;PELVIC/BREAST EXAM: HCPCS | Performed by: OBSTETRICS & GYNECOLOGY

## 2022-10-03 RX ORDER — HYDROCORTISONE 25 MG/ML
LOTION TOPICAL 2 TIMES DAILY
Qty: 59 ML | Refills: 3 | Status: SHIPPED | OUTPATIENT
Start: 2022-10-03 | End: 2022-10-04 | Stop reason: SDUPTHER

## 2022-10-03 RX ORDER — AZITHROMYCIN 250 MG/1
TABLET, FILM COATED ORAL
COMMUNITY
Start: 2022-09-21

## 2022-10-03 NOTE — PROGRESS NOTES
Assessment   78 y o  postmenopausal female presenting for annual exam      Plan   Diagnoses and all orders for this visit:    Well woman exam with routine gynecological exam  - Pap not indicated  Age, no hx dysplasia  - Mammo due  - Colonoscopy up to date  - DEXA up to date  - Return in 1yr    Breast cancer screening by mammogram  Dense breast tissue on mammogram  -     Mammo screening bilateral w 3d & cad; Future    Colon cancer screening  - Up to date    Osteopenia, unspecified location  Current chronic use of systemic steroids  - DEXA up to date  - Continue weight bearing exercises    Lichen sclerosus et atrophicus  -     hydrocortisone 2 5 % lotion; Apply topically 2 (two) times a day as needed for vulvar itching for up to 2 weeks  Call MD if symptoms persist after this time  - Discussed agglutination  May be improved with topical estrogens  Pt unbothered by finding and declines treatment        __________________________________________________________________      Subjective     78 y o  postmenopausal female presenting for annual exam      Lichen last visit  Rx for steroid provided  Reports good relief of symptoms, but cream provided is very thick and wants to know if a better option is available  GYN  Complaints: denies  Denies dyspareunia, genital discharge, genital ulcers, pelvic pain and vulvar/vaginal symptoms  Menopause occurred at age 53-48  She has had no bleeding since this time    Menopausal symptoms: none  Sexually active: Yes - single partner - male  Hx STI: denies   Hx Abnormal pap: denies  Last pap:  - NILM, HPV neg     OB  [id number] ( x2, c/s x1, 9lb 4oz)  Pregnancy complications: miscarriages, needed vaginal suppositories in preg       Complaints: denies  Denies urinary frequency, hematuria, urinary incontinence and dysuria     BREAST  Complaints: denies  Denies: breast lump, breast tenderness, changed mole, dryness, nipple discharge, pruritus, rash, skin color change and skin lesion(s)  Last mammogram:  - birads1  Personal hx: dense  Family hx: denies fhx of breast, uterine, ovarian, or colon cancers  Patient does do regular self-exams     GENERAL  PMH reviewed/updated and is as below  Patient does follow with a PCP  Retired  Denies domestic violence  Exercise: walks dog 2hr/day, stationary bike  Diet: lower diary intake     SCREENING  Cervical Ca: pap up to date; ASCCP guidelines discussed  Breast Ca: mammo ordered  Colon Ca:  - colonoscopy - repeat 7yr   Metabolic: 8273 - DEXA - osteopenia (improved from osteoporosis)        Past Medical History:   Diagnosis Date    DVT, lower extremity ([facility] [address] )     Right leg- was a smoker and on birth control    Fibromyalgia     History of transfusion     Post-op knee replacement  No adverse reaction    Hypothyroidism     Mass     Left foot    Peptic ulceration     perforated 3/2022    PONV (postoperative nausea and vomiting)     RA (rheumatoid arthritis) ([facility] [address] )     Wears glasses     For reading       Past Surgical History:   Procedure Laterality Date     SECTION      x1    COLONOSCOPY  Febuary     CYST REMOVAL Left     excision - Baker's cyst - knee     EXPLORATORY LAPAROTOMY  2022    perf peptic ulcer  s/p ex lap, luis alfredo patch repair, EGD    FRACTURE SURGERY Right     Right ankle is fused  Has screws    JOINT REPLACEMENT Bilateral     TKR's   Lt- x1; Rt-x1 and then revision    KNEE ARTHROSCOPY Bilateral     NH EXC TUMOR SOFT TISSUE FOOT/TOE SUBFASC <1 5CM Left 10/30/2018    Procedure: FOOT SOFT TISSUE MASS EXCISION;  Surgeon: Felicia Montero DPM;  Location: [facility];  Service: Podiatry    TUBAL LIGATION      WISDOM TOOTH EXTRACTION           Current Outpatient Medications:     Ascorbic Acid (VITAMIN C) 1000 MG tablet, Take 1,000 mg by mouth daily, Disp: , Rfl:     Biotin 78000 MCG TABS, Take by mouth, Disp: , Rfl:     Cholecalciferol 2000 units CAPS, Take 1 capsule by mouth, Disp: , Rfl:    cyclobenzaprine (FLEXERIL) 10 mg tablet, Take 1 tablet (10 mg total) by mouth daily at bedtime, Disp: 90 tablet, Rfl: 1    cycloSPORINE (RESTASIS) 0 05 % ophthalmic emulsion, Apply 1 drop to eye, Disp: , Rfl:     desonide (DESOWEN) 0 05 % cream, desonide 0 05 % topical cream  APPLY TO AFFECTED AREAS OF THE BODY TWICE A DAY FOR 2 WEEKS AS NEEDED FOR FLARES **NOT COVERED**, Disp: , Rfl:     folic acid (FOLVITE) 1 mg tablet, Take 1 tablet by mouth daily, Disp: , Rfl:     hydrochlorothiazide (HYDRODIURIL) 25 mg tablet, Take 1 tablet by mouth daily as needed, Disp: , Rfl:     hydrocortisone 2 5 % cream, hydrocortisone 2 5 % topical cream  APPLY TWICE DAILY TO THE AFFECTED AREAS FOR TWO WEEKS, REPEAT IN 2 WEEKS AS NEEDED, Disp: , Rfl:     methotrexate 25 MG/ML, Inject as directed Weekly- 0 6 ml , Disp: , Rfl:     Multiple Vitamins-Minerals (HAIR/SKIN/NAILS/BIOTIN) TABS, Take 1 tablet by mouth daily, Disp: , Rfl:     oxyCODONE-acetaminophen (PERCOCET) 5-325 mg per tablet, Take 1 tablet by mouth every 4 (four) hours as needed , Disp: , Rfl:     pantoprazole (PROTONIX) 40 mg tablet, pantoprazole 40 mg tablet,delayed release, Disp: , Rfl:     Synthroid 50 MCG tablet, TAKE 1 TABLET BY MOUTH  DAILY, Disp: 90 tablet, Rfl: 3    triamcinolone (KENALOG) 0 5 % ointment, Apply topically 2 (two) times a day, Disp: 15 g, Rfl: 3    Adalimumab (Humira) 40 MG/0 4ML PSKT, 0 4 mL (Patient not taking: Reported on 10/3/2022), Disp: , Rfl:     azithromycin (ZITHROMAX) 250 mg tablet, As needed, Disp: , Rfl:     Methotrexate 20 MG/0 8ML SOSY, 0 8 mL (Patient not taking: Reported on 10/3/2022), Disp: , Rfl:     methotrexate 50 MG/2ML injection, INJECT 0 6 MLS ONCE WEEKLY (Patient not taking: No sig reported), Disp: , Rfl:     predniSONE 5 mg tablet, Take 5 mg by mouth as needed  (Patient not taking: No sig reported), Disp: , Rfl: 2    Allergies   Allergen Reactions    Cephalexin Rash    Nuts - Food Allergy Hives and Swelling Swelling in throat  Poppy seeds also cause this reaction    Papaver Wheezing    Erythromycin GI Intolerance    Penicillins Rash     Pt unsure of how bad reaction was-  Occurred in childhood    Ciprofloxacin Rash       Social History     Tobacco Use    Smoking status: Former Smoker     Packs/day: 1 00     Years: 10      Pack years: 10      Types: Cigarettes     Quit date: 1987     Years since quittin 7    Smokeless tobacco: Never Used   Vaping Use    Vaping Use: Never used   Substance Use Topics    Alcohol use: Not Currently     Comment: 1 or 2 weekly    Drug use: No           Objective  /68   Ht 5' 3" (1 6 m)   Wt 55 8 kg (123 lb)   LMP 2006   BMI 21 79 kg/m²      Physical Exam:  Physical Exam  Exam conducted with a chaperone present  Constitutional:       General: She is not in acute distress  Appearance: Normal appearance  She is well-developed  She is not ill-appearing, toxic-appearing or diaphoretic  HENT:      Head: Normocephalic and atraumatic  Eyes:      General: No scleral icterus  Right eye: No discharge  Left eye: No discharge  Conjunctiva/sclera: Conjunctivae normal    Cardiovascular:      Rate and Rhythm: Normal rate  Pulmonary:      Effort: Pulmonary effort is normal  No accessory muscle usage or respiratory distress  Chest:   Breasts: Breasts are symmetrical       Right: No inverted nipple, mass, nipple discharge, skin change, tenderness or axillary adenopathy  Left: No inverted nipple, mass, nipple discharge, skin change, tenderness or axillary adenopathy  Abdominal:      General: There is no distension  Palpations: Abdomen is soft  There is no mass  Tenderness: There is no abdominal tenderness  There is no guarding or rebound  Genitourinary:     General: Normal vulva  Exam position: Lithotomy position  Labia:         Right: No rash, tenderness or lesion           Left: No rash, tenderness or lesion  Urethra: No prolapse, urethral swelling or urethral lesion  Vagina: No signs of injury  No vaginal discharge, erythema (thin, pale epithelia with loss of rugae, consistent with atrophy), tenderness, bleeding or lesions  Cervix: No cervical motion tenderness, discharge, friability, lesion or erythema  Uterus: Not enlarged, not fixed and not tender  Adnexa:         Right: No mass, tenderness or fullness  Left: No mass, tenderness or fullness  Rectum: No external hemorrhoid  Normal anal tone  Comments: Dec caliper of vaginal introitus and mild agglutination of lower labia  No white/thin skin, rashes, or lesions  Lymphadenopathy:      Upper Body:      Right upper body: No axillary or pectoral adenopathy  Left upper body: No axillary or pectoral adenopathy  Skin:     General: Skin is warm and dry  Findings: No erythema or rash  Neurological:      Mental Status: She is alert  Psychiatric:         Mood and Affect: Mood normal          Behavior: Behavior normal          Thought Content:  Thought content normal          Judgment: Judgment normal

## 2022-10-04 DIAGNOSIS — L90.0 LICHEN SCLEROSUS ET ATROPHICUS: ICD-10-CM

## 2022-10-04 RX ORDER — HYDROCORTISONE 25 MG/ML
LOTION TOPICAL 2 TIMES DAILY
Qty: 59 ML | Refills: 3 | Status: SHIPPED | OUTPATIENT
Start: 2022-10-04

## 2022-10-04 NOTE — TELEPHONE ENCOUNTER
Pt requesting medication to be sent to different pharmacy and to be sent to [facility] pharmacy in [place]  Please review  Thank you

## 2022-10-10 ENCOUNTER — HOSPITAL ENCOUNTER (OUTPATIENT)
Dept: MAMMOGRAPHY | Facility: MEDICAL CENTER | Age: 67
Discharge: HOME | End: 2022-10-10
Payer: COMMERCIAL

## 2022-10-10 VITALS — HEIGHT: 63 IN | BODY MASS INDEX: 21.8 KG/M2 | WEIGHT: 123.02 LBS

## 2022-10-10 DIAGNOSIS — Z12.31 BREAST CANCER SCREENING BY MAMMOGRAM: ICD-10-CM

## 2022-10-10 DIAGNOSIS — R92.2 DENSE BREAST TISSUE ON MAMMOGRAM: ICD-10-CM

## 2022-10-10 PROCEDURE — 77067 SCR MAMMO BI INCL CAD: CPT

## 2022-10-10 PROCEDURE — 77063 BREAST TOMOSYNTHESIS BI: CPT

## 2022-12-15 DIAGNOSIS — M06.9 RHEUMATOID ARTHRITIS (HCC): ICD-10-CM

## 2022-12-16 RX ORDER — CYCLOBENZAPRINE HCL 10 MG
TABLET ORAL
Qty: 90 TABLET | Refills: 1 | Status: SHIPPED | OUTPATIENT
Start: 2022-12-16

## 2023-04-25 DIAGNOSIS — E03.9 HYPOTHYROIDISM, UNSPECIFIED TYPE: ICD-10-CM

## 2023-04-25 RX ORDER — LEVOTHYROXINE SODIUM 50 MCG
TABLET ORAL
Qty: 90 TABLET | Refills: 3 | Status: SHIPPED | OUTPATIENT
Start: 2023-04-25

## 2023-05-02 NOTE — PROGRESS NOTES
Assessment/Plan:      Diagnoses and all orders for this visit:    Vaginal irritation  -     nystatin (MYCOSTATIN) cream; Apply topically 2 (two) times a day  -     triamcinolone (KENALOG) 0 5 % ointment; Apply topically 2 (two) times a day    Skin yeast infection  -     nystatin (MYCOSTATIN) cream; Apply topically 2 (two) times a day  -     triamcinolone (KENALOG) 0 5 % ointment; Apply topically 2 (two) times a day      - reviewed diagnosis of vulvar yeast infection and treatment with Mycolog written information provided  -If no improvement can consider trial of clobetasol  Has been using hydrocortisone in the past for lichen sclerosus  -Hygiene reviewed including avoid heavily scented/antibacterial soaps, avoid tight/restrictive clothing  - signs and symptoms to report reviewed     RTO Annual exam     Subjective:     Patient ID: Andreia Schneider is a 75 y o  female  HPI P3 here with complaints of vulvar/groin itching for past 2 weeks  LMP - postmenopausal, denies PMB  Rash is described as raised, red and weeping  Associated symptoms include itching  Alleviating factors include hydrocortisone cream, temporarily  Aggravating factors include being hot or hot water  Has not tried any OTC remedies  Has had similar episodes and treated for lichen sclerosis in the past  Denies fever, chills, pelvic pain, vaginal discharge/ odor, bowel or bladder concerns     Last pap smear 6/1/21 NILM/ HR HPV(-)  Last mammogram 10/10/22 extremely dense breasts/ birads 1   Last DEXA scan 8/11/21 low bone density   CRC screening 2/15/21 colonoscopy     Review of Systems   Constitutional: Negative for chills, fatigue and fever  Respiratory: Negative  Cardiovascular: Negative  Genitourinary: Negative for decreased urine volume, difficulty urinating, dyspareunia, dysuria, enuresis, flank pain, frequency, genital sores, hematuria, menstrual problem, pelvic pain, urgency, vaginal bleeding, vaginal discharge and vaginal pain  "Vulvar/ groin itching and weeping rash          Objective:  /79   Ht 5' 3\" (1 6 m)   Wt 54 4 kg (120 lb)   LMP 05/18/2006   BMI 21 26 kg/m²      Physical Exam  Vitals reviewed  Exam conducted with a chaperone present  Constitutional:       Appearance: Normal appearance  Genitourinary:     Exam position: Lithotomy position  Labia:         Right: Rash present  Left: Rash present  Neurological:      Mental Status: She is alert and oriented to person, place, and time     Psychiatric:         Mood and Affect: Mood normal          Behavior: Behavior normal          "

## 2023-05-05 ENCOUNTER — OFFICE VISIT (OUTPATIENT)
Dept: OBGYN CLINIC | Facility: MEDICAL CENTER | Age: 68
End: 2023-05-05

## 2023-05-05 VITALS
SYSTOLIC BLOOD PRESSURE: 128 MMHG | DIASTOLIC BLOOD PRESSURE: 79 MMHG | WEIGHT: 120 LBS | HEIGHT: 63 IN | BODY MASS INDEX: 21.26 KG/M2

## 2023-05-05 DIAGNOSIS — B37.2 SKIN YEAST INFECTION: ICD-10-CM

## 2023-05-05 DIAGNOSIS — N89.8 VAGINAL IRRITATION: Primary | ICD-10-CM

## 2023-05-05 RX ORDER — NYSTATIN 100000 U/G
CREAM TOPICAL 2 TIMES DAILY
Qty: 30 G | Refills: 0 | Status: SHIPPED | OUTPATIENT
Start: 2023-05-05

## 2023-05-05 RX ORDER — TRIAMCINOLONE ACETONIDE 5 MG/G
OINTMENT TOPICAL 2 TIMES DAILY
Qty: 30 G | Refills: 0 | Status: SHIPPED | OUTPATIENT
Start: 2023-05-05

## 2023-06-29 ENCOUNTER — OFFICE VISIT (OUTPATIENT)
Dept: FAMILY MEDICINE CLINIC | Facility: CLINIC | Age: 68
End: 2023-06-29
Payer: COMMERCIAL

## 2023-06-29 VITALS
HEART RATE: 78 BPM | WEIGHT: 119.4 LBS | HEIGHT: 64 IN | TEMPERATURE: 97.1 F | RESPIRATION RATE: 16 BRPM | DIASTOLIC BLOOD PRESSURE: 78 MMHG | SYSTOLIC BLOOD PRESSURE: 126 MMHG | OXYGEN SATURATION: 98 % | BODY MASS INDEX: 20.38 KG/M2

## 2023-06-29 DIAGNOSIS — H60.501 ACUTE OTITIS EXTERNA OF RIGHT EAR, UNSPECIFIED TYPE: Primary | ICD-10-CM

## 2023-06-29 PROCEDURE — 99213 OFFICE O/P EST LOW 20 MIN: CPT | Performed by: FAMILY MEDICINE

## 2023-06-29 NOTE — PROGRESS NOTES
Name: Lili Silverman      : 1955      MRN: [id number]  Encounter Provider: Skyla Negron DO  Encounter Date: 2023   Encounter department: [address] [place] [address]   Patient was started on Cortisporin otic solution 4 drops to the right ear to 6 hours  Avoid using Q-tips in the ear  Return to the office in 1 week or call sooner as needed  1  Acute otitis externa of right ear, unspecified type  -     neomycin-polymyxin-hydrocortisone (CORTISPORIN) otic solution; Administer 4 drops to the right ear every 6 (six) hours           Subjective      Patient complains of right ear irritation and scaly the past week  Symptoms started after getting fluid in her ear at the hairdresser  Since yesterday she complains of mild ear pain  She denies drainage  She admits to decreased hearing  She denies headache or cold symptoms although admits to allergy symptoms this time year  She has treated this with hydrocortisone cream     Earache   There is pain in the right ear  The current episode started yesterday  The problem has been gradually worsening  There has been no fever  The pain is mild  Associated symptoms include hearing loss and rhinorrhea  Pertinent negatives include no coughing, ear discharge, headaches or sore throat  Treatments tried: HC cream  The treatment provided mild relief  Review of Systems   HENT: Positive for ear pain, hearing loss and rhinorrhea  Negative for ear discharge and sore throat  Respiratory: Negative for cough  Neurological: Negative for headaches         Current Outpatient Medications on File Prior to Visit   Medication Sig   • Adalimumab (Humira) 40 MG/0 4ML PSKT 0 4 mL   • Ascorbic Acid (VITAMIN C) 1000 MG tablet Take 1,000 mg by mouth daily   • azithromycin (ZITHROMAX) 250 mg tablet As needed   • Biotin 11068 MCG TABS Take by mouth   • Cholecalciferol 2000 units CAPS Take 1 capsule by mouth   • cyclobenzaprine (FLEXERIL) 10 mg tablet TAKE "1 TABLET BY MOUTH  DAILY AT BEDTIME   • cycloSPORINE (RESTASIS) 0 05 % ophthalmic emulsion Apply 1 drop to eye   • folic acid (FOLVITE) 1 mg tablet Take 1 tablet by mouth daily   • hydrochlorothiazide (HYDRODIURIL) 25 mg tablet Take 1 tablet by mouth daily as needed   • hydrocortisone 2 5 % lotion Apply topically 2 (two) times a day as needed for vulvar itching for up to 2 weeks  Call MD if symptoms persist after this time  (Patient not taking: Reported on 5/5/2023)   • methotrexate 25 MG/ML Inject as directed Weekly- 0 6 ml    • Multiple Vitamins-Minerals (HAIR/SKIN/NAILS/BIOTIN) TABS Take 1 tablet by mouth daily   • nystatin (MYCOSTATIN) cream Apply topically 2 (two) times a day   • oxyCODONE-acetaminophen (PERCOCET) 5-325 mg per tablet Take 1 tablet by mouth every 4 (four) hours as needed    • pantoprazole (PROTONIX) 40 mg tablet pantoprazole 40 mg tablet,delayed release   • predniSONE 5 mg tablet Take 5 mg by mouth as needed   • Synthroid 50 MCG tablet TAKE 1 TABLET BY MOUTH  DAILY   • triamcinolone (KENALOG) 0 5 % ointment Apply topically 2 (two) times a day       Objective     /78 (BP Location: Left arm, Patient Position: Sitting, Cuff Size: Standard)   Pulse 78   Temp (!) 97 1 °F (36 2 °C) (Tympanic)   Resp 16   Ht 5' 4\" (1 626 m)   Wt 54 2 kg (119 lb 6 4 oz)   LMP 05/18/2006   SpO2 98%   BMI 20 49 kg/m²     Physical Exam  Constitutional:       General: She is not in acute distress  Appearance: Normal appearance  She is not ill-appearing, toxic-appearing or diaphoretic  HENT:      Head: Normocephalic  Left Ear: Tympanic membrane normal       Ears:      Comments: Right ear canal with inflammation  Nose: Congestion present  Mouth/Throat:      Mouth: Mucous membranes are moist       Pharynx: Oropharynx is clear  Eyes:      General: No scleral icterus  Conjunctiva/sclera: Conjunctivae normal    Cardiovascular:      Rate and Rhythm: Normal rate and regular rhythm   " Pulmonary:      Effort: Pulmonary effort is normal       Breath sounds: Normal breath sounds  Musculoskeletal:      Cervical back: Neck supple  Right lower leg: No edema  Left lower leg: No edema  Lymphadenopathy:      Cervical: No cervical adenopathy  Skin:     General: Skin is warm  Neurological:      General: No focal deficit present  Mental Status: She is alert  Psychiatric:         Mood and Affect: Mood normal          Behavior: Behavior normal          Thought Content:  Thought content normal          Judgment: Judgment normal        Zandra Youssef DO

## 2023-07-10 ENCOUNTER — OFFICE VISIT (OUTPATIENT)
Dept: FAMILY MEDICINE CLINIC | Facility: CLINIC | Age: 68
End: 2023-07-10
Payer: COMMERCIAL

## 2023-07-10 VITALS
RESPIRATION RATE: 16 BRPM | TEMPERATURE: 98 F | DIASTOLIC BLOOD PRESSURE: 82 MMHG | HEIGHT: 64 IN | OXYGEN SATURATION: 99 % | HEART RATE: 88 BPM | BODY MASS INDEX: 20.32 KG/M2 | WEIGHT: 119 LBS | SYSTOLIC BLOOD PRESSURE: 120 MMHG

## 2023-07-10 DIAGNOSIS — E55.9 VITAMIN D DEFICIENCY: ICD-10-CM

## 2023-07-10 DIAGNOSIS — Z00.00 MEDICARE ANNUAL WELLNESS VISIT, SUBSEQUENT: Primary | ICD-10-CM

## 2023-07-10 DIAGNOSIS — H60.501 ACUTE OTITIS EXTERNA OF RIGHT EAR, UNSPECIFIED TYPE: ICD-10-CM

## 2023-07-10 DIAGNOSIS — E03.9 HYPOTHYROIDISM, UNSPECIFIED TYPE: ICD-10-CM

## 2023-07-10 DIAGNOSIS — E78.00 HYPERCHOLESTEROLEMIA: ICD-10-CM

## 2023-07-10 DIAGNOSIS — M06.9 RHEUMATOID ARTHRITIS (HCC): ICD-10-CM

## 2023-07-10 PROCEDURE — G0439 PPPS, SUBSEQ VISIT: HCPCS | Performed by: FAMILY MEDICINE

## 2023-07-10 PROCEDURE — 99214 OFFICE O/P EST MOD 30 MIN: CPT | Performed by: FAMILY MEDICINE

## 2023-07-10 RX ORDER — CYCLOBENZAPRINE HCL 10 MG
10 TABLET ORAL
Qty: 90 TABLET | Refills: 1 | Status: SHIPPED | OUTPATIENT
Start: 2023-07-10

## 2023-07-10 NOTE — PROGRESS NOTES
Name: Nando Chung      : 1955      MRN: [id number]  Encounter Provider: Benny Bass DO  Encounter Date: 7/10/2023   Encounter department: [address] [place] [address]   Patient given lab requisition for fasting lipid panel, TSH and vitamin D. Patient to continue present treatment. Instructed to follow a low-fat and a low-cholesterol diet and continue regular aerobic exercise 150 minutes/week. Patient is being referred to Dr. Caroline Blanchard, ENT regarding ongoing right ear problems. Follow-up with specialist as scheduled and return to the office in 1 year. 1. Medicare annual wellness visit, subsequent    2. Hypothyroidism, unspecified type  -     TSH, 3rd generation with Free T4 reflex; Future    3. Acute otitis externa of right ear, unspecified type  -     Ambulatory Referral to Otolaryngology; Future    4. Hypercholesterolemia  -     Lipid panel; Future    5. Rheumatoid arthritis (HCC)  -     cyclobenzaprine (FLEXERIL) 10 mg tablet; Take 1 tablet (10 mg total) by mouth daily at bedtime    6. Vitamin D deficiency  -     Vitamin D 25 hydroxy; Future           Subjective      Patient is here for annual Medicare wellness exam and follow-up of chronic conditions. Patient's been feeling fairly well overall. She exercises regularly walking her dog and swimming. Patient follows with rheumatology regularly and gets labs done routinely. Recent labs reviewed. Patient is up-to-date on colonoscopy and mammogram.  She complains of right ear continued feeling blocked and itchy. Patient follows with dermatologist yearly. Thyroid Problem  Presents for follow-up visit. Symptoms include constipation and dry skin. Patient reports no anxiety, cold intolerance, depressed mood, diaphoresis, diarrhea, fatigue, hair loss, heat intolerance, hoarse voice, leg swelling, palpitations, tremors, visual change, weight gain or weight loss. The symptoms have been stable.      Review of Systems Constitutional: Negative for diaphoresis, fatigue, weight gain and weight loss. HENT: Negative for hoarse voice. Cardiovascular: Negative for palpitations. Gastrointestinal: Positive for constipation. Negative for diarrhea. Endocrine: Negative for cold intolerance and heat intolerance. Neurological: Negative for tremors. Psychiatric/Behavioral: The patient is not nervous/anxious. Current Outpatient Medications on File Prior to Visit   Medication Sig   • Adalimumab (Humira) 40 MG/0.4ML PSKT 0.4 mL   • Ascorbic Acid (VITAMIN C) 1000 MG tablet Take 1,000 mg by mouth daily   • azithromycin (ZITHROMAX) 250 mg tablet As needed   • Biotin 36784 MCG TABS Take by mouth   • Cholecalciferol 2000 units CAPS Take 1 capsule by mouth   • cycloSPORINE (RESTASIS) 0.05 % ophthalmic emulsion Apply 1 drop to eye   • folic acid (FOLVITE) 1 mg tablet Take 1 tablet by mouth daily   • hydrochlorothiazide (HYDRODIURIL) 25 mg tablet Take 1 tablet by mouth daily as needed   • methotrexate 25 MG/ML Inject as directed Weekly- 0.6 ml    • Multiple Vitamins-Minerals (HAIR/SKIN/NAILS/BIOTIN) TABS Take 1 tablet by mouth daily   • neomycin-polymyxin-hydrocortisone (CORTISPORIN) otic solution Administer 4 drops to the right ear every 6 (six) hours   • nystatin (MYCOSTATIN) cream Apply topically 2 (two) times a day   • oxyCODONE-acetaminophen (PERCOCET) 5-325 mg per tablet Take 1 tablet by mouth every 4 (four) hours as needed    • pantoprazole (PROTONIX) 40 mg tablet pantoprazole 40 mg tablet,delayed release   • predniSONE 5 mg tablet Take 5 mg by mouth as needed   • Synthroid 50 MCG tablet TAKE 1 TABLET BY MOUTH  DAILY   • triamcinolone (KENALOG) 0.5 % ointment Apply topically 2 (two) times a day   • [DISCONTINUED] cyclobenzaprine (FLEXERIL) 10 mg tablet TAKE 1 TABLET BY MOUTH  DAILY AT BEDTIME   • hydrocortisone 2.5 % lotion Apply topically 2 (two) times a day as needed for vulvar itching for up to 2 weeks.  Call MD if symptoms persist after this time. (Patient not taking: Reported on 5/5/2023)       Objective     /82 (BP Location: Left arm, Patient Position: Sitting, Cuff Size: Standard)   Pulse 88   Temp 98 °F (36.7 °C) (Tympanic)   Resp 16   Ht 5' 4" (1.626 m)   Wt 54 kg (119 lb)   LMP 05/18/2006   SpO2 99%   BMI 20.43 kg/m²     Physical Exam  Constitutional:       General: She is not in acute distress. Appearance: Normal appearance. HENT:      Head: Normocephalic. Left Ear: Tympanic membrane and ear canal normal.      Ears:      Comments: Right external auditory canal with inflammation. Nose: Nose normal.      Mouth/Throat:      Mouth: Mucous membranes are moist.      Pharynx: Oropharynx is clear. Eyes:      General: No scleral icterus. Conjunctiva/sclera: Conjunctivae normal.   Neck:      Vascular: No carotid bruit. Cardiovascular:      Rate and Rhythm: Normal rate and regular rhythm. Pulmonary:      Effort: Pulmonary effort is normal.      Breath sounds: Normal breath sounds. Abdominal:      Palpations: Abdomen is soft. Tenderness: There is no abdominal tenderness. Musculoskeletal:      Cervical back: Neck supple. Right lower leg: No edema. Left lower leg: No edema. Lymphadenopathy:      Cervical: No cervical adenopathy. Skin:     General: Skin is warm and dry. Neurological:      General: No focal deficit present. Mental Status: She is alert and oriented to person, place, and time. Psychiatric:         Mood and Affect: Mood normal.         Behavior: Behavior normal.         Thought Content:  Thought content normal.         Judgment: Judgment normal.       Kishore Ruiz DO  Answers for HPI/ROS submitted by the patient on 7/4/2023  How would you rate your overall health?: good  Compared to last year, how is your physical health?: slightly worse  In general, how satisfied are you with your life?: satisfied  Compared to last year, how is your eyesight?: same  Compared to last year, how is your hearing?: same  Compared to last year, how is your emotional/mental health?: same  How often is anger a problem for you?: never, rarely  How often do you feel unusually tired/fatigued?: sometimes  In the past 7 days, how much pain have you experienced?: some  If you answered "some" or "a lot", please rate the severity of your pain on a scale of 1 to 10 (1 being the least severe pain and 10 being the most intense pain). : 5/10  In the past 6 months, have you lost or gained 10 pounds without trying?: No  One or more falls in the last year: No  In the past 6 months, have you accidentally leaked urine?: No  Do you have trouble with the stairs inside or outside your home?: Yes  Does your home have working smoke alarms?: Yes  Does your home have a carbon monoxide monitor?: No  Which safety hazards (if any) have you experienced in your home? Please select all that apply.: none  How would you describe your current diet? Please select all that apply.: Low Saturated Fat  In addition to prescription medications, are you taking any over-the-counter supplements?: Yes  If yes, what supplements are you taking?: multivitamin, folic acid, vitamins C, D.   Can you manage your medications?: Yes  Are you currently taking any opioid medications?: Yes  Can you walk and transfer into and out of your bed and chair?: Yes  Can you dress and groom yourself?: Yes  Can you bathe or shower yourself?: Yes  Can you feed yourself?: Yes  Can you do your laundry/ housekeeping?: Yes  Can you manage your money, pay your bills, and track your expenses?: Yes  Can you make your own meals?: Yes  Can you do your own shopping?: Yes  Within the last 12 months, have you had any hospitalizations or Emergency Department visits?: No  Do you have a living will?: Yes  Do you have a Durable POA (Power of ) for healthcare decisions?: Yes  Do you have an Advanced Directive for end of life decisions?: Yes  How often have you used an illegal drug (including marijuana) or a prescription medication for non-medical reasons in the past year?: never  What is the typical number of drinks you consume in a day?: 0  What is the typical number of drinks you consume in a week?: 0  How often did you have a drink containing alcohol in the past year?: monthly or less  How many drinks did you have on a typical day  when you were drinking in the past year?: 1 to 2  How often did you have 6 or more drinks on one occasion in the past year?: never

## 2023-07-10 NOTE — PROGRESS NOTES
Assessment and Plan:     Problem List Items Addressed This Visit    None       Preventive health issues were discussed with patient, and age appropriate screening tests were ordered as noted in patient's After Visit Summary. Personalized health advice and appropriate referrals for health education or preventive services given if needed, as noted in patient's After Visit Summary. History of Present Illness:     Patient presents for a Medicare Wellness Visit    HPI   Patient Care Team:  Anaid Chery DO as PCP - General  [facility]     Review of Systems:     Review of Systems     Problem List:     Patient Active Problem List   Diagnosis   • Abnormal finding on mammography   • Allergic rhinitis   • Hypothyroidism   • Rheumatoid arthritis ([address])   • Rosacea   • Unsatisfactory cervical Papanicolaou smear   • Vitamin D deficiency   • Gastric ulcer   • Continuous opioid dependence ([address])   • Hypercholesterolemia      Past Medical and Surgical History:     Past Medical History:   Diagnosis Date   • DVT, lower extremity ([address])     Right leg- was a smoker and on birth control   • Fibromyalgia    • History of transfusion     Post-op knee replacement. No adverse reaction   • Hypothyroidism    • Mass     Left foot   • Peptic ulceration     perforated 3/2022   • PONV (postoperative nausea and vomiting)    • RA (rheumatoid arthritis) ([address])    • Wears glasses     For reading     Past Surgical History:   Procedure Laterality Date   •  SECTION      x1   • COLONOSCOPY  Febuary    • CYST REMOVAL Left     excision - Baker's cyst - knee    • EXPLORATORY LAPAROTOMY  2022    perf peptic ulcer. s/p ex lap, luis alfredo patch repair, EGD   • FRACTURE SURGERY Right     Right ankle is fused. Has screws   • JOINT REPLACEMENT Bilateral     TKR's.  Lt- x1; Rt-x1 and then revision   • KNEE ARTHROSCOPY Bilateral    • AZ EXC TUMOR SOFT TISSUE FOOT/TOE SUBFASC <1.5CM Left 10/30/2018    Procedure: FOOT SOFT TISSUE MASS EXCISION;  Surgeon: Trent Cox DPM;  Location: [facility];  Service: Podiatry   • TUBAL LIGATION     • WISDOM TOOTH EXTRACTION        Family History:     Family History   Problem Relation Age of Onset   • Diabetes Mother    • Hypertension Mother    • Hypothyroidism Mother    • Thyroid disease Mother    • Coronary artery disease Father    • Heart disease Father    • No Known Problems Daughter    • No Known Problems Maternal Grandmother    • Liver cancer Maternal Grandfather 59   • No Known Problems Paternal Grandmother    • Throat cancer Paternal Grandfather 79   • No Known Problems Daughter    • No Known Problems Paternal Aunt    • No Known Problems Paternal Aunt    • No Known Problems Paternal Aunt    • No Known Problems Paternal Aunt    • No Known Problems Paternal Aunt       Social History:     Social History     Socioeconomic History   • Marital status: /Civil Union     Spouse name: None   • Number of children: None   • Years of education: None   • Highest education level: None   Occupational History   • None   Tobacco Use   • Smoking status: Former     Packs/day: 1.00     Years: 10.00     Total pack years: 10.00     Types: Cigarettes     Quit date: 1987     Years since quittin.5   • Smokeless tobacco: Never   Vaping Use   • Vaping Use: Never used   Substance and Sexual Activity   • Alcohol use: Not Currently     Comment: socially   • Drug use: Yes     Types: Marijuana     Comment: medical marijuana card   • Sexual activity: Yes     Partners: Male     Birth control/protection: Female Sterilization   Other Topics Concern   • None   Social History Narrative   • None     Social Determinants of Health     Financial Resource Strain: Low Risk  (2023)    Overall Financial Resource Strain (CARDIA)    • Difficulty of Paying Living Expenses: Not hard at all   Food Insecurity: Not on file   Transportation Needs: No Transportation Needs (2023) PRAPARE - Transportation    • Lack of Transportation (Medical): No    • Lack of Transportation (Non-Medical): No   Physical Activity: Not on file   Stress: Not on file   Social Connections: Not on file   Intimate Partner Violence: Not on file   Housing Stability: Not on file      Medications and Allergies:     Current Outpatient Medications   Medication Sig Dispense Refill   • Adalimumab (Humira) 40 MG/0.4ML PSKT 0.4 mL     • Ascorbic Acid (VITAMIN C) 1000 MG tablet Take 1,000 mg by mouth daily     • azithromycin (ZITHROMAX) 250 mg tablet As needed     • Biotin 15224 MCG TABS Take by mouth     • Cholecalciferol 2000 units CAPS Take 1 capsule by mouth     • cyclobenzaprine (FLEXERIL) 10 mg tablet TAKE 1 TABLET BY MOUTH  DAILY AT BEDTIME 90 tablet 1   • cycloSPORINE (RESTASIS) 0.05 % ophthalmic emulsion Apply 1 drop to eye     • folic acid (FOLVITE) 1 mg tablet Take 1 tablet by mouth daily     • hydrochlorothiazide (HYDRODIURIL) 25 mg tablet Take 1 tablet by mouth daily as needed     • methotrexate 25 MG/ML Inject as directed Weekly- 0.6 ml      • Multiple Vitamins-Minerals (HAIR/SKIN/NAILS/BIOTIN) TABS Take 1 tablet by mouth daily     • neomycin-polymyxin-hydrocortisone (CORTISPORIN) otic solution Administer 4 drops to the right ear every 6 (six) hours 10 mL 0   • nystatin (MYCOSTATIN) cream Apply topically 2 (two) times a day 30 g 0   • oxyCODONE-acetaminophen (PERCOCET) 5-325 mg per tablet Take 1 tablet by mouth every 4 (four) hours as needed      • pantoprazole (PROTONIX) 40 mg tablet pantoprazole 40 mg tablet,delayed release     • predniSONE 5 mg tablet Take 5 mg by mouth as needed  2   • Synthroid 50 MCG tablet TAKE 1 TABLET BY MOUTH  DAILY 90 tablet 3   • triamcinolone (KENALOG) 0.5 % ointment Apply topically 2 (two) times a day 30 g 0   • hydrocortisone 2.5 % lotion Apply topically 2 (two) times a day as needed for vulvar itching for up to 2 weeks. Call MD if symptoms persist after this time.  (Patient not taking: Reported on 5/5/2023) 59 mL 3     No current facility-administered medications for this visit. Allergies   Allergen Reactions   • Cephalexin Rash   • Nuts - Food Allergy Hives and Swelling     Swelling in throat. Poppy seeds also cause this reaction   • Papaver Wheezing   • Erythromycin GI Intolerance   • Penicillins Rash     Pt unsure of how bad reaction was-  Occurred in childhood   • Other Swelling     poppyseed   • Ciprofloxacin Rash      Immunizations:     Immunization History   Administered Date(s) Administered   • COVID-19 MODERNA VACC 0.5 ML IM 03/20/2021, 04/17/2021   • H1N1, All Formulations 11/20/2009   • INFLUENZA 11/07/2020, 11/06/2021, 11/05/2022   • Influenza, injectable, quadrivalent, preservative free 0.5 mL 09/23/2018, 10/12/2019   • Influenza, seasonal, injectable 11/01/2015, 12/04/2018, 10/23/2019   • Pneumococcal Conjugate 13-Valent 06/22/2021   • Pneumococcal Polysaccharide PPV23 04/22/2013   • Zoster Vaccine Recombinant 09/23/2018      Health Maintenance:         Topic Date Due   • Breast Cancer Screening: Mammogram  10/10/2023   • Cervical Cancer Screening  06/01/2026   • Colorectal Cancer Screening  02/15/2031   • Hepatitis C Screening  Completed         Topic Date Due   • COVID-19 Vaccine (3 - Donalee Osbaldo risk series) 05/15/2021   • Pneumococcal Vaccine: 65+ Years (3 - PPSV23 if available, else PCV20) 06/22/2022   • Influenza Vaccine (1) 09/01/2023      Medicare Screening Tests and Risk Assessments:     Denise Luong is here for her Subsequent Wellness visit. Health Risk Assessment:   Patient rates overall health as good. Patient feels that their physical health rating is slightly worse. Patient is satisfied with their life. Eyesight was rated as same. Hearing was rated as same. Patient feels that their emotional and mental health rating is same. Patients states they are never, rarely angry. Patient states they are sometimes unusually tired/fatigued.  Pain experienced in the last 7 days has been some. Patient's pain rating has been 5/10. Patient states that she has experienced no weight loss or gain in last 6 months. Fall Risk Screening: In the past year, patient has experienced: no history of falling in past year      Urinary Incontinence Screening:   Patient has not leaked urine accidently in the last six months. Home Safety:  Patient has trouble with stairs inside or outside of their home. Patient has working smoke alarms and has no working carbon monoxide detector. Home safety hazards include: none. Nutrition:   Current diet is Low Saturated Fat. Medications:   Patient is currently taking over-the-counter supplements. OTC medications include: multivitamin, folic acid, vitamins C, D.. Patient is able to manage medications. Activities of Daily Living (ADLs)/Instrumental Activities of Daily Living (IADLs):   Walk and transfer into and out of bed and chair?: Yes  Dress and groom yourself?: Yes    Bathe or shower yourself?: Yes    Feed yourself? Yes  Do your laundry/housekeeping?: Yes  Manage your money, pay your bills and track your expenses?: Yes  Make your own meals?: Yes    Do your own shopping?: Yes    Previous Hospitalizations:   Any hospitalizations or ED visits within the last 12 months?: No      Advance Care Planning:   Living will: Yes    Durable POA for healthcare:  Yes    Advanced directive: Yes      Cognitive Screening:   Provider or family/friend/caregiver concerned regarding cognition?: No    PREVENTIVE SCREENINGS      Cardiovascular Screening:    General: Screening Not Indicated, History Lipid Disorder and Risks and Benefits Discussed    Due for: Lipid Panel      Diabetes Screening:     General: Risks and Benefits Discussed and Screening Not Indicated      Colorectal Cancer Screening:     General: Screening Current      Breast Cancer Screening:     General: Screening Current and Risks and Benefits Discussed      Cervical Cancer Screening:    General: Risks and Benefits Discussed and Screening Current      Osteoporosis Screening:    General: Risks and Benefits Discussed and Screening Current      Abdominal Aortic Aneurysm (AAA) Screening:        General: Risks and Benefits Discussed and Screening Not Indicated      Lung Cancer Screening:     General: Screening Not Indicated and Risks and Benefits Discussed      Hepatitis C Screening:    General: Screening Current    Screening, Brief Intervention, and Referral to Treatment (SBIRT)    Screening  Typical number of drinks in a day: 0  Typical number of drinks in a week: 0  Interpretation: Low risk drinking behavior. AUDIT-C Screenin) How often did you have a drink containing alcohol in the past year? monthly or less  2) How many drinks did you have on a typical day when you were drinking in the past year? 1 to 2  3) How often did you have 6 or more drinks on one occasion in the past year? never    AUDIT-C Score: 1  Interpretation: Score 0-2 (female): Negative screen for alcohol misuse    Single Item Drug Screening:  How often have you used an illegal drug (including marijuana) or a prescription medication for non-medical reasons in the past year? never    Single Item Drug Screen Score: 0  Interpretation: Negative screen for possible drug use disorder    Brief Intervention  Alcohol & drug use screenings were reviewed. No concerns regarding substance use disorder identified. Review of Current Opioid Use    Opioid Risk Tool (ORT) Interpretation: Complete Opioid Risk Tool (ORT)    Other Counseling Topics:   Car/seat belt/driving safety, skin self-exam, sunscreen and calcium and vitamin D intake and regular weightbearing exercise. No results found.      Physical Exam:     Temp 98 °F (36.7 °C) (Tympanic)   Ht 5' 4" (1.626 m)   Wt 54 kg (119 lb)   LMP 2006   BMI 20.43 kg/m²     Physical Exam     Juliano Salgado DO

## 2023-10-09 ENCOUNTER — ANNUAL EXAM (OUTPATIENT)
Dept: OBGYN CLINIC | Facility: MEDICAL CENTER | Age: 68
End: 2023-10-09
Payer: COMMERCIAL

## 2023-10-09 VITALS
DIASTOLIC BLOOD PRESSURE: 76 MMHG | HEIGHT: 64 IN | WEIGHT: 120.8 LBS | BODY MASS INDEX: 20.62 KG/M2 | SYSTOLIC BLOOD PRESSURE: 122 MMHG

## 2023-10-09 DIAGNOSIS — Z12.11 COLON CANCER SCREENING: ICD-10-CM

## 2023-10-09 DIAGNOSIS — M85.89 OSTEOPENIA OF MULTIPLE SITES: ICD-10-CM

## 2023-10-09 DIAGNOSIS — Z12.31 BREAST CANCER SCREENING BY MAMMOGRAM: ICD-10-CM

## 2023-10-09 DIAGNOSIS — L90.0 LICHEN SCLEROSUS ET ATROPHICUS: ICD-10-CM

## 2023-10-09 DIAGNOSIS — Z01.419 WELL WOMAN EXAM WITH ROUTINE GYNECOLOGICAL EXAM: Primary | ICD-10-CM

## 2023-10-09 PROCEDURE — G0101 CA SCREEN;PELVIC/BREAST EXAM: HCPCS | Performed by: OBSTETRICS & GYNECOLOGY

## 2023-10-09 RX ORDER — TRIAMCINOLONE ACETONIDE 1 MG/G
CREAM TOPICAL
COMMUNITY
Start: 2023-07-25

## 2023-10-09 RX ORDER — METHOTREXATE 25 MG/ML
INJECTION, SOLUTION INTRA-ARTERIAL; INTRAMUSCULAR; INTRAVENOUS
COMMUNITY
Start: 2023-08-14

## 2023-10-09 RX ORDER — KETOCONAZOLE 20 MG/G
CREAM TOPICAL
COMMUNITY
Start: 2023-10-08

## 2023-10-09 RX ORDER — LEVOCETIRIZINE DIHYDROCHLORIDE 5 MG/1
TABLET, FILM COATED ORAL
COMMUNITY

## 2023-10-09 NOTE — PROGRESS NOTES
Assessment   75 y.o. postmenopausal woman presenting for annual exam.     Plan   Diagnoses and all orders for this visit:    Well woman exam with routine gynecological exam  - Pap up to date  - Mammo/DEXA ordered  - Colonoscopy current  - Return in 1yr for yearly    Breast cancer screening by mammogram  -     Mammo screening bilateral w 3d & cad; Future    Colon cancer screening  - Up to date    Osteopenia of multiple sites  -     DXA bone density spine hip and pelvis; Future    Lichen sclerosus et atrophicus  - Discussed psb contribution of RA treatments  - Encouraged moisturizer to affected area -- coconut oil, aquafor, aveeno, etc  - Sparing steroid use for symptom relief  - Call office if persistent      __________________________________________________________________      Subjective     Patric Henson is a 75 y.o. postmenopausal female presenting for annual exam.     Persistent itching of vulva. Steroid helps but does not ever completely resolve. Also occurs under axilla, behind ear, and lower spine/stip gluteal cleft. Uses hypoallergenic soaps/detergents.      GYN  Complaints: denies  Denies dyspareunia, genital discharge, genital ulcers, pelvic pain and vulvar/vaginal symptoms  Menopause occurred at age 50-55. She has had no bleeding since this time.   Menopausal symptoms: none  Sexually active: Yes - single partner - male  Hx STI: denies   Hx Abnormal pap: denies  Last pap:  - NILM, HPV neg     OB   ( x2, c/s x1, 9lb 4oz)  Pregnancy complications: miscarriages, needed vaginal suppositories in preg       Complaints: denies  Denies urinary frequency, hematuria, urinary incontinence and dysuria     BREAST  Complaints: denies  Denies: breast lump, breast tenderness, changed mole, dryness, nipple discharge, pruritus, rash, skin color change and skin lesion(s)  Last mammogram:  - birads  Personal hx: dense  Family hx: denies fhx of breast, uterine, ovarian, or colon cancers  Patient does do regular self-exams     GENERAL  PMH reviewed/updated and is as below. Patient does follow with a PCP. Retired. Denies domestic violence. Exercise: walks dog 2hr/day, stationary bike  Diet: lower diary intake     SCREENING  Cervical Ca: pap up to date; ASCCP guidelines discussed  Breast Ca: mammo ordered  Colon DE:  - colonoscopy - [facility] [id number]   Metabolic:  - DEXA - osteopenia (improved from osteoporosis)        Past Medical History:   Diagnosis Date   • Allergic rhinitis    • Disease of thyroid gland    • DVT, lower extremity ([address])     Right leg- was a smoker and on birth control   • Ear problems    • Fibromyalgia    • History of transfusion     Post-op knee replacement. No adverse reaction   • Hypothyroidism    • Mass     Left foot   • Peptic ulceration     perforated 3/2022   • PONV (postoperative nausea and vomiting)    • RA (rheumatoid arthritis) ([address])    • Wears glasses     For reading       Past Surgical History:   Procedure Laterality Date   •  SECTION      x1   • COLONOSCOPY  Febuary    • CYST REMOVAL Left     excision - Baker's cyst - knee    • EXPLORATORY LAPAROTOMY  2022    perf peptic ulcer. s/p ex lap, luis alfredo patch repair, EGD   • FRACTURE SURGERY Right     Right ankle is fused. Has screws   • JOINT REPLACEMENT Bilateral     TKR's.  Lt- x1; Rt-x1 and then revision   • KNEE ARTHROSCOPY Bilateral    • IL EXC TUMOR SOFT TISSUE FOOT/TOE SUBFASC <1.5CM Left 10/30/2018    Procedure: FOOT SOFT TISSUE MASS EXCISION;  Surgeon: Rossi Cruz DPM;  Location: [facility];  Service: Podiatry   • TUBAL LIGATION     • WISDOM TOOTH EXTRACTION           Current Outpatient Medications:   •  Adalimumab (Humira) 40 MG/0.4ML PSKT, 0.4 mL, Disp: , Rfl:   •  Ascorbic Acid (VITAMIN C) 1000 MG tablet, Take 1,000 mg by mouth daily, Disp: , Rfl:   •  azithromycin (ZITHROMAX) 250 mg tablet, As needed, Disp: , Rfl:   •  Biotin 94551 MCG TABS, Take by mouth, Disp: , Rfl:   •  cyclobenzaprine (FLEXERIL) 10 mg tablet, Take 1 tablet (10 mg total) by mouth daily at bedtime, Disp: 90 tablet, Rfl: 1  •  cycloSPORINE (RESTASIS) 0.05 % ophthalmic emulsion, Apply 1 drop to eye, Disp: , Rfl:   •  folic acid (FOLVITE) 1 mg tablet, Take 1 tablet by mouth daily, Disp: , Rfl:   •  hydrochlorothiazide (HYDRODIURIL) 25 mg tablet, Take 1 tablet by mouth daily as needed, Disp: , Rfl:   •  ketoconazole (NIZORAL) 2 % cream, , Disp: , Rfl:   •  levocetirizine (Xyzal Allergy 24HR) 5 MG tablet, , Disp: , Rfl:   •  methotrexate 50 MG/2ML injection, INJECT 0.6 ML ONCE A WEEK, Disp: , Rfl:   •  Multiple Vitamins-Minerals (HAIR/SKIN/NAILS/BIOTIN) TABS, Take 1 tablet by mouth daily, Disp: , Rfl:   •  nystatin (MYCOSTATIN) cream, Apply topically 2 (two) times a day, Disp: 30 g, Rfl: 0  •  oxyCODONE-acetaminophen (PERCOCET) 5-325 mg per tablet, Take 1 tablet by mouth every 4 (four) hours as needed , Disp: , Rfl:   •  pantoprazole (PROTONIX) 40 mg tablet, pantoprazole 40 mg tablet,delayed release, Disp: , Rfl:   •  predniSONE 5 mg tablet, Take 5 mg by mouth as needed, Disp: , Rfl: 2  •  Synthroid 50 MCG tablet, TAKE 1 TABLET BY MOUTH  DAILY, Disp: 90 tablet, Rfl: 3  •  triamcinolone (KENALOG) 0.1 % cream, APPLY TO AFFECTED AREA'S OF EARS AND UNDERARMS TWICE DAILY X2 WEEKS, THEN AS NEEDED FOR FLARES, Disp: , Rfl:   •  triamcinolone (KENALOG) 0.5 % ointment, Apply topically 2 (two) times a day, Disp: 30 g, Rfl: 0  •  Cholecalciferol 2000 units CAPS, Take 1 capsule by mouth, Disp: , Rfl:   •  methotrexate 25 MG/ML, Inject as directed Weekly- 0.6 ml , Disp: , Rfl:     Allergies   Allergen Reactions   • Cephalexin Rash   • Nuts - Food Allergy Hives and Swelling     Swelling in throat.   Poppy seeds also cause this reaction   • Papaver Wheezing   • Erythromycin GI Intolerance   • Penicillins Rash     Pt unsure of how bad reaction was-  Occurred in childhood   • Other Swelling     poppyseed   • Ciprofloxacin Rash       Social History Tobacco Use   • Smoking status: Former     Packs/day: 1.00     Years: 10.00     Total pack years: 10.00     Types: Cigarettes     Quit date: 1987     Years since quittin.7   • Smokeless tobacco: Never   Vaping Use   • Vaping Use: Never used   Substance Use Topics   • Alcohol use: Yes     Comment: socially   • Drug use: Yes     Types: Marijuana, Oxycodone     Comment: medical marijuana card           Objective  /76   Ht 5' 4" (1.626 m)   Wt 54.8 kg (120 lb 12.8 oz)   LMP 2006   BMI 20.74 kg/m²      Physical Exam:  Physical Exam  Exam conducted with a chaperone present. Constitutional:       General: She is not in acute distress. Appearance: Normal appearance. She is well-developed. She is not ill-appearing, toxic-appearing or diaphoretic. HENT:      Head: Normocephalic and atraumatic. Eyes:      General: No scleral icterus. Right eye: No discharge. Left eye: No discharge. Conjunctiva/sclera: Conjunctivae normal.   Cardiovascular:      Rate and Rhythm: Normal rate. Pulmonary:      Effort: Pulmonary effort is normal. No accessory muscle usage or respiratory distress. Chest:   Breasts:     Breasts are symmetrical.      Right: No inverted nipple, mass, nipple discharge, skin change or tenderness. Left: No inverted nipple, mass, nipple discharge, skin change or tenderness. Abdominal:      General: There is no distension. Palpations: Abdomen is soft. There is no mass. Tenderness: There is no abdominal tenderness. There is no guarding or rebound. Genitourinary:     General: Normal vulva. Exam position: Lithotomy position. Labia:         Right: Rash (thin line of erythema under b/l labia majora) present. No tenderness or lesion. Left: Rash present. No tenderness or lesion. Urethra: No prolapse, urethral swelling or urethral lesion. Vagina: No signs of injury.  No vaginal discharge, erythema (thin, pale epithelia with loss of rugae), tenderness, bleeding or lesions. Cervix: No cervical motion tenderness, discharge, friability, lesion or erythema. Uterus: Not enlarged, not fixed and not tender. Adnexa:         Right: No mass, tenderness or fullness. Left: No mass, tenderness or fullness. Rectum: No external hemorrhoid. Normal anal tone. Comments: Dec caliper of vaginal introitus and mild agglutination of lower labia. No white/thin skin, rashes, or lesions  Lymphadenopathy:      Upper Body:      Right upper body: No axillary or pectoral adenopathy. Left upper body: No axillary or pectoral adenopathy. Skin:     General: Skin is warm and dry. Findings: No erythema or rash (dry skin under axilla b/l). Neurological:      Mental Status: She is alert. Psychiatric:         Mood and Affect: Mood normal.         Behavior: Behavior normal.         Thought Content:  Thought content normal.         Judgment: Judgment normal.

## 2023-11-30 DIAGNOSIS — M06.9 RHEUMATOID ARTHRITIS (HCC): ICD-10-CM

## 2023-12-01 RX ORDER — CYCLOBENZAPRINE HCL 10 MG
10 TABLET ORAL
Qty: 90 TABLET | Refills: 1 | Status: SHIPPED | OUTPATIENT
Start: 2023-12-01

## 2023-12-29 ENCOUNTER — OFFICE VISIT (OUTPATIENT)
Dept: FAMILY MEDICINE CLINIC | Facility: CLINIC | Age: 68
End: 2023-12-29
Payer: COMMERCIAL

## 2023-12-29 VITALS
TEMPERATURE: 98 F | OXYGEN SATURATION: 98 % | SYSTOLIC BLOOD PRESSURE: 164 MMHG | HEART RATE: 107 BPM | HEIGHT: 64 IN | BODY MASS INDEX: 20.49 KG/M2 | WEIGHT: 120 LBS | DIASTOLIC BLOOD PRESSURE: 84 MMHG

## 2023-12-29 DIAGNOSIS — Z79.4 ENCOUNTER FOR LONG-TERM (CURRENT) USE OF INSULIN (HCC): ICD-10-CM

## 2023-12-29 DIAGNOSIS — F11.20 CONTINUOUS OPIOID DEPENDENCE (HCC): ICD-10-CM

## 2023-12-29 DIAGNOSIS — U07.1 COVID-19: Primary | ICD-10-CM

## 2023-12-29 PROCEDURE — 99213 OFFICE O/P EST LOW 20 MIN: CPT | Performed by: FAMILY MEDICINE

## 2023-12-29 RX ORDER — NIRMATRELVIR AND RITONAVIR 300-100 MG
3 KIT ORAL 2 TIMES DAILY
Qty: 30 TABLET | Refills: 0 | Status: SHIPPED | OUTPATIENT
Start: 2023-12-29 | End: 2024-01-03

## 2023-12-29 NOTE — PROGRESS NOTES
COVID-19 Outpatient Progress Note    Assessment/Plan:    Problem List Items Addressed This Visit        Other    Continuous opioid dependence (HCC)    Encounter for long-term (current) use of insulin (HCC)   Other Visit Diagnoses     COVID-19    -  Primary    Relevant Medications    nirmatrelvir & ritonavir (Paxlovid, 300/100,) tablet therapy pack           Disposition:     Patient with asymptomatic/mild COVID-19: They were recommended to isolate for at least 5 days (day 0 is the day symptoms appeared or the date the specimen was collected for the positive test for people who are asymptomatic). If they are asymptomatic or symptoms are improving with no fevers in the past 24 hours, isolation may be ended followed by 5 days of wearing a high quality mask when around others to minimize risk of infecting others. They should wear a mask through day 10 and a test-based strategy may be used to remove a mask sooner.      Discussed symptom directed medication options with patient.     Patient meets criteria for Paxlovid and they have been counseled appropriately regarding risks, benefits, side effects, and alternative treatment options. After discussion, patient agrees to treatment.    Possible side effects of Paxlovid?    Possible side effects of Paxlovid are:  - Liver Problems. Notify us right away if you start to experience loss of appetite, yellowing of your skin and the whites of eyes (jaundice), dark-colored urine, pale colored stools and itchy skin, stomach area (abdominal) pain.  - Resistance to HIV Medicines. If you have untreated HIV infection, Paxlovid may lead to some HIV medicines not working as well in the future.  - Other possible side effects include: altered sense of taste, diarrhea, high blood pressure, or muscle aches.    I have spent a total time of 15 minutes on the day of the encounter for this patient including discussing prognosis, risks and benefits of treatment options and instructions for  "management. Discussed the benefits and risks of Paxlovid while taking chronic opioid medications.  Did discuss increased effect of oxycodone while on Paxlovid and to monitor for signs and symptoms of increased tiredness, constipation, to reduce dose as needed.  Discussed delaying Humira injection.  Discussed avoiding pseudoephedrine due to elevated blood pressure      Encounter provider: Marlena Smith DO     Provider located at: [address]  [facility]  [address]  [facility] [id number]     Recent Visits  No visits were found meeting these conditions.  Showing recent visits within past 7 days and meeting all other requirements  Today's Visits  Date Type Provider Dept   12/29/23 Office Visit Marlena Smith DO [facility]   Showing today's visits and meeting all other requirements  Future Appointments  No visits were found meeting these conditions.  Showing future appointments within next 150 days and meeting all other requirements     Subjective:   Joy Roberts is a 68 y.o. female who is concerned about COVID-19. Patient's symptoms include chills, fatigue, nasal congestion, rhinorrhea, sore throat, cough and myalgias. Patient denies fever, anosmia, loss of taste, shortness of breath, chest tightness, abdominal pain, nausea, vomiting, diarrhea and headaches.     - Date of symptom onset: 12/26/2023      COVID-19 vaccination status: Fully vaccinated (primary series)    No results found for: \"SARSCOV2\", \"[id number]\", \"SARSCORONAVI\", \"CORONAVIRUSR\", \"SARSCOVAG\", \"SARSCOVAGH\"    Review of Systems   Constitutional:  Positive for chills and fatigue. Negative for fever.   HENT:  Positive for congestion, rhinorrhea and sore throat.    Respiratory:  Positive for cough. Negative for chest tightness and shortness of breath.    Gastrointestinal:  Negative for abdominal pain, diarrhea, nausea and vomiting.   Musculoskeletal:  Positive for myalgias.   Neurological:  Negative for headaches. " "    Current Outpatient Medications on File Prior to Visit   Medication Sig   • Adalimumab (Humira) 40 MG/0.4ML PSKT 0.4 mL   • Ascorbic Acid (VITAMIN C) 1000 MG tablet Take 1,000 mg by mouth daily   • azithromycin (ZITHROMAX) 250 mg tablet As needed   • Biotin 31378 MCG TABS Take by mouth   • Cholecalciferol 2000 units CAPS Take 1 capsule by mouth   • cyclobenzaprine (FLEXERIL) 10 mg tablet TAKE 1 TABLET BY MOUTH DAILY AT  BEDTIME   • cycloSPORINE (RESTASIS) 0.05 % ophthalmic emulsion Apply 1 drop to eye   • folic acid (FOLVITE) 1 mg tablet Take 1 tablet by mouth daily   • hydrochlorothiazide (HYDRODIURIL) 25 mg tablet Take 1 tablet by mouth daily as needed   • ketoconazole (NIZORAL) 2 % cream    • levocetirizine (Xyzal Allergy 24HR) 5 MG tablet    • methotrexate 25 MG/ML Inject as directed Weekly- 0.6 ml    • methotrexate 50 MG/2ML injection INJECT 0.6 ML ONCE A WEEK   • Multiple Vitamins-Minerals (HAIR/SKIN/NAILS/BIOTIN) TABS Take 1 tablet by mouth daily   • nystatin (MYCOSTATIN) cream Apply topically 2 (two) times a day   • oxyCODONE-acetaminophen (PERCOCET) 5-325 mg per tablet Take 1 tablet by mouth every 4 (four) hours as needed    • pantoprazole (PROTONIX) 40 mg tablet pantoprazole 40 mg tablet,delayed release   • predniSONE 5 mg tablet Take 5 mg by mouth as needed   • Synthroid 50 MCG tablet TAKE 1 TABLET BY MOUTH  DAILY   • triamcinolone (KENALOG) 0.1 % cream APPLY TO AFFECTED AREA'S OF EARS AND UNDERARMS TWICE DAILY X2 WEEKS, THEN AS NEEDED FOR FLARES   • triamcinolone (KENALOG) 0.5 % ointment Apply topically 2 (two) times a day       Objective:    /84 (BP Location: Left arm, Patient Position: Sitting, Cuff Size: Standard)   Pulse (!) 107   Temp 98 °F (36.7 °C) (Tympanic)   Ht 5' 4\" (1.626 m)   Wt 54.4 kg (120 lb)   LMP 05/18/2006   SpO2 98%   BMI 20.60 kg/m²        Physical Exam  Vitals reviewed.   Constitutional:       General: She is not in acute distress.     Appearance: She is " well-developed. She is ill-appearing.   HENT:      Head: Normocephalic and atraumatic.      Right Ear: Tympanic membrane, ear canal and external ear normal.      Left Ear: Tympanic membrane, ear canal and external ear normal.      Nose: Nose normal.      Mouth/Throat:      Mouth: Mucous membranes are moist.      Pharynx: Oropharynx is clear.   Eyes:      Conjunctiva/sclera: Conjunctivae normal.   Cardiovascular:      Rate and Rhythm: Normal rate and regular rhythm.      Heart sounds: No murmur heard.  Pulmonary:      Effort: Pulmonary effort is normal. No respiratory distress.      Breath sounds: Normal breath sounds.   Abdominal:      Palpations: Abdomen is soft.      Tenderness: There is no abdominal tenderness.   Musculoskeletal:         General: No swelling.      Cervical back: Neck supple.   Skin:     General: Skin is warm and dry.      Capillary Refill: Capillary refill takes less than 2 seconds.   Neurological:      Mental Status: She is alert.   Psychiatric:         Mood and Affect: Mood normal.         Marlena Smith,

## 2024-01-02 ENCOUNTER — HOSPITAL ENCOUNTER (OUTPATIENT)
Dept: MAMMOGRAPHY | Facility: MEDICAL CENTER | Age: 69
Discharge: HOME | End: 2024-01-02
Payer: COMMERCIAL

## 2024-01-02 ENCOUNTER — HOSPITAL ENCOUNTER (OUTPATIENT)
Dept: BONE DENSITY | Facility: MEDICAL CENTER | Age: 69
Discharge: HOME | End: 2024-01-02
Payer: COMMERCIAL

## 2024-01-02 VITALS — WEIGHT: 119.93 LBS | HEIGHT: 64 IN | BODY MASS INDEX: 20.47 KG/M2

## 2024-01-02 VITALS — BODY MASS INDEX: 20.49 KG/M2 | HEIGHT: 64 IN | WEIGHT: 120 LBS

## 2024-01-02 DIAGNOSIS — M85.89 OSTEOPENIA OF MULTIPLE SITES: ICD-10-CM

## 2024-01-02 DIAGNOSIS — Z12.31 BREAST CANCER SCREENING BY MAMMOGRAM: ICD-10-CM

## 2024-01-02 PROCEDURE — 77080 DXA BONE DENSITY AXIAL: CPT

## 2024-01-02 PROCEDURE — 77067 SCR MAMMO BI INCL CAD: CPT

## 2024-01-02 PROCEDURE — 77063 BREAST TOMOSYNTHESIS BI: CPT

## 2024-01-29 DIAGNOSIS — E03.9 HYPOTHYROIDISM, UNSPECIFIED TYPE: ICD-10-CM

## 2024-01-30 RX ORDER — LEVOTHYROXINE SODIUM 50 MCG
TABLET ORAL
Qty: 90 TABLET | Refills: 1 | Status: SHIPPED | OUTPATIENT
Start: 2024-01-30

## 2024-04-09 DIAGNOSIS — E03.9 HYPOTHYROIDISM, UNSPECIFIED TYPE: ICD-10-CM

## 2024-04-09 RX ORDER — LEVOTHYROXINE SODIUM 50 MCG
TABLET ORAL
Qty: 100 TABLET | Refills: 1 | Status: SHIPPED | OUTPATIENT
Start: 2024-04-09

## 2024-05-30 ENCOUNTER — OFFICE VISIT (OUTPATIENT)
Dept: FAMILY MEDICINE CLINIC | Facility: CLINIC | Age: 69
End: 2024-05-30
Payer: COMMERCIAL

## 2024-05-30 VITALS
SYSTOLIC BLOOD PRESSURE: 138 MMHG | DIASTOLIC BLOOD PRESSURE: 80 MMHG | HEIGHT: 63 IN | BODY MASS INDEX: 21.16 KG/M2 | RESPIRATION RATE: 16 BRPM | HEART RATE: 104 BPM | OXYGEN SATURATION: 99 % | TEMPERATURE: 99.2 F | WEIGHT: 119.4 LBS

## 2024-05-30 DIAGNOSIS — R50.9 FEVER, UNSPECIFIED FEVER CAUSE: ICD-10-CM

## 2024-05-30 DIAGNOSIS — J40 BRONCHITIS: Primary | ICD-10-CM

## 2024-05-30 LAB
SARS-COV-2 AG UPPER RESP QL IA: NEGATIVE
VALID CONTROL: NORMAL

## 2024-05-30 PROCEDURE — 99213 OFFICE O/P EST LOW 20 MIN: CPT | Performed by: FAMILY MEDICINE

## 2024-05-30 PROCEDURE — 87811 SARS-COV-2 COVID19 W/OPTIC: CPT | Performed by: FAMILY MEDICINE

## 2024-05-30 PROCEDURE — G2211 COMPLEX E/M VISIT ADD ON: HCPCS | Performed by: FAMILY MEDICINE

## 2024-05-30 RX ORDER — AZITHROMYCIN 250 MG/1
TABLET, FILM COATED ORAL
Qty: 6 TABLET | Refills: 0 | Status: SHIPPED | OUTPATIENT
Start: 2024-05-30 | End: 2024-06-03

## 2024-05-30 NOTE — PROGRESS NOTES
Assessment/Plan:   Rapid COVID test is negative.  Patient be started on Z-Bang and instructed to take Robitussin or Mucinex as needed.  She may continue Tylenol as needed.  Recommend increase fluids and rest.  Return to the office in 1 week or call sooner as needed.   Diagnoses and all orders for this visit:    Bronchitis  -     azithromycin (ZITHROMAX) 250 mg tablet; Take 2 tablets today then 1 tablet daily x 4 days    Fever, unspecified fever cause  -     POCT Rapid Covid Ag          Subjective:     Patient ID: Joy Roberts is a 68 y.o. female.    Patient started 6 days ago with cold symptoms.  She complains of fever to 101, headache, chills and nonproductive cough.  She has treated this with Tylenol with some relief.    Fever  Associated symptoms include chills, coughing, a fever and headaches. Pertinent negatives include no chest pain, myalgias or sore throat.   Cough  This is a new problem. The current episode started in the past 7 days. The problem has been gradually worsening. The cough is Productive of purulent sputum. Associated symptoms include chills, a fever, headaches, nasal congestion, postnasal drip and rhinorrhea. Pertinent negatives include no chest pain, ear pain, hemoptysis, myalgias, sore throat, shortness of breath, sweats or wheezing. Treatments tried: tylenol. The treatment provided mild relief. Her past medical history is significant for bronchitis. There is no history of asthma, COPD or pneumonia.       Review of Systems   Constitutional:  Positive for chills and fever.   HENT:  Positive for postnasal drip and rhinorrhea. Negative for ear pain and sore throat.    Respiratory:  Positive for cough. Negative for hemoptysis, shortness of breath and wheezing.    Cardiovascular:  Negative for chest pain.   Musculoskeletal:  Negative for myalgias.   Neurological:  Positive for headaches.         Objective:     Physical Exam  Constitutional:       General: She is not in acute distress.     Appearance:  Normal appearance. She is ill-appearing. She is not toxic-appearing or diaphoretic.   HENT:      Head: Normocephalic.      Right Ear: Tympanic membrane normal.      Left Ear: Tympanic membrane normal.      Nose: Nose normal.      Mouth/Throat:      Mouth: Mucous membranes are moist.      Pharynx: Oropharynx is clear.   Eyes:      General: No scleral icterus.     Conjunctiva/sclera: Conjunctivae normal.   Cardiovascular:      Rate and Rhythm: Normal rate and regular rhythm.   Pulmonary:      Effort: Pulmonary effort is normal. No respiratory distress.      Breath sounds: Normal breath sounds. No wheezing.   Abdominal:      Palpations: Abdomen is soft.      Tenderness: There is no abdominal tenderness.   Musculoskeletal:      Cervical back: Neck supple.      Right lower leg: No edema.      Left lower leg: No edema.   Lymphadenopathy:      Cervical: No cervical adenopathy.   Skin:     General: Skin is warm and dry.   Neurological:      General: No focal deficit present.      Mental Status: She is alert and oriented to person, place, and time.   Psychiatric:         Mood and Affect: Mood normal.         Behavior: Behavior normal.         Thought Content: Thought content normal.         Judgment: Judgment normal.

## 2024-06-19 DIAGNOSIS — M06.9 RHEUMATOID ARTHRITIS (HCC): ICD-10-CM

## 2024-06-19 RX ORDER — CYCLOBENZAPRINE HCL 10 MG
10 TABLET ORAL
Qty: 90 TABLET | Refills: 1 | Status: SHIPPED | OUTPATIENT
Start: 2024-06-19

## 2024-07-16 ENCOUNTER — OFFICE VISIT (OUTPATIENT)
Dept: FAMILY MEDICINE CLINIC | Facility: CLINIC | Age: 69
End: 2024-07-16
Payer: COMMERCIAL

## 2024-07-16 VITALS
HEIGHT: 63 IN | SYSTOLIC BLOOD PRESSURE: 124 MMHG | TEMPERATURE: 97.1 F | RESPIRATION RATE: 16 BRPM | WEIGHT: 120.2 LBS | OXYGEN SATURATION: 99 % | BODY MASS INDEX: 21.3 KG/M2 | HEART RATE: 84 BPM | DIASTOLIC BLOOD PRESSURE: 70 MMHG

## 2024-07-16 DIAGNOSIS — M06.9 RHEUMATOID ARTHRITIS, INVOLVING UNSPECIFIED SITE, UNSPECIFIED WHETHER RHEUMATOID FACTOR PRESENT (HCC): ICD-10-CM

## 2024-07-16 DIAGNOSIS — E78.00 HYPERCHOLESTEROLEMIA: ICD-10-CM

## 2024-07-16 DIAGNOSIS — E55.9 VITAMIN D DEFICIENCY: ICD-10-CM

## 2024-07-16 DIAGNOSIS — E03.9 HYPOTHYROIDISM, UNSPECIFIED TYPE: ICD-10-CM

## 2024-07-16 DIAGNOSIS — F11.20 CONTINUOUS OPIOID DEPENDENCE (HCC): ICD-10-CM

## 2024-07-16 DIAGNOSIS — Z00.00 MEDICARE ANNUAL WELLNESS VISIT, SUBSEQUENT: Primary | ICD-10-CM

## 2024-07-16 PROCEDURE — G0439 PPPS, SUBSEQ VISIT: HCPCS | Performed by: FAMILY MEDICINE

## 2024-07-16 PROCEDURE — 99214 OFFICE O/P EST MOD 30 MIN: CPT | Performed by: FAMILY MEDICINE

## 2024-07-16 NOTE — PROGRESS NOTES
Assessment/Plan:   Patient given lab requisition for fasting labs as below to be drawn with next labs per rheumatology.  Patient to continue present treatment.  Instructed to follow a low-fat, low salt and a low carbohydrate diet continue regular aerobic exercise swimming or stationary bike up to 150 minutes/week and weightbearing exercise.  Follow-up with specialist as scheduled.  Return to the office 1 year.   Diagnoses and all orders for this visit:    Medicare annual wellness visit, subsequent    Hypothyroidism, unspecified type  -     TSH, 3rd generation with Free T4 reflex; Future    Hypercholesterolemia  -     Lipid Panel with Direct LDL reflex; Future    Rheumatoid arthritis, involving unspecified site, unspecified whether rheumatoid factor present (HCC)    Continuous opioid dependence (HCC)    Vitamin D deficiency  -     Vitamin D 25 hydroxy; Future    Other orders  -     NON FORMULARY; Medical Marijuana Oral Edible          Subjective:     Patient ID: Joy Roberts is a 68 y.o. female.    Patient is here for annual Medicare wellness exam and follow-up of chronic conditions.  Recent labs reviewed.  Patient been feeling fairly well overall and exercises in her swimming pool daily.  Patient is up-to-date on mammogram, DEXA scan and colonoscopy.  Patient follows with dermatologist yearly.    Thyroid Problem  Presents for follow-up visit. Symptoms include constipation, dry skin and leg swelling. Patient reports no anxiety, cold intolerance, depressed mood, diaphoresis, diarrhea, fatigue, hair loss, heat intolerance, hoarse voice, palpitations, tremors, visual change, weight gain or weight loss. The symptoms have been stable.       Review of Systems   Constitutional:  Negative for diaphoresis, fatigue, weight gain and weight loss.   HENT:  Negative for hoarse voice.    Cardiovascular:  Negative for palpitations.   Gastrointestinal:  Positive for constipation. Negative for diarrhea.   Endocrine: Negative for cold  "intolerance and heat intolerance.   Neurological:  Negative for tremors.   Psychiatric/Behavioral:  The patient is not nervous/anxious.          Objective:     Physical Exam  Constitutional:       General: She is not in acute distress.     Appearance: Normal appearance.   HENT:      Head: Normocephalic.      Mouth/Throat:      Mouth: Mucous membranes are moist.   Eyes:      General: No scleral icterus.     Conjunctiva/sclera: Conjunctivae normal.   Neck:      Vascular: No carotid bruit.   Cardiovascular:      Rate and Rhythm: Normal rate and regular rhythm.   Pulmonary:      Effort: Pulmonary effort is normal.      Breath sounds: Normal breath sounds.   Abdominal:      Palpations: Abdomen is soft.      Tenderness: There is no abdominal tenderness.   Musculoskeletal:      Cervical back: Neck supple.      Right lower leg: No edema.      Left lower leg: No edema.   Lymphadenopathy:      Cervical: No cervical adenopathy.   Skin:     General: Skin is warm and dry.   Neurological:      General: No focal deficit present.      Mental Status: She is alert and oriented to person, place, and time.   Psychiatric:         Mood and Affect: Mood normal.         Behavior: Behavior normal.         Thought Content: Thought content normal.         Judgment: Judgment normal.           Answers submitted by the patient for this visit:  Medicare Annual Wellness Visit (Submitted on 7/9/2024)  How would you rate your overall health?: good  Compared to last year, how is your physical health?: slightly worse  In general, how satisfied are you with your life?: satisfied  Compared to last year, how is your eyesight?: same  Compared to last year, how is your hearing?: same  Compared to last year, how is your emotional/mental health?: same  How often is anger a problem for you?: never, rarely  How often do you feel unusually tired/fatigued?: sometimes  In the past 7 days, how much pain have you experienced?: some  If you answered \"some\" or \"a " "lot\", please rate the severity of your pain on a scale of 1 to 10 (1 being the least severe pain and 10 being the most intense pain).: 4/10  In the past 6 months, have you lost or gained 10 pounds without trying?: No  One or more falls in the last year: No  In the past 6 months, have you accidentally leaked urine?: No  Do you have trouble with the stairs inside or outside your home?: Yes  Does your home have working smoke alarms?: Yes  Does your home have a carbon monoxide monitor?: No  Which safety hazards (if any) have you experienced in your home? Please select all that apply.: none  How would you describe your current diet? Please select all that apply.: Regular  In addition to prescription medications, are you taking any over-the-counter supplements?: Yes  If yes, what supplements are you taking?: Vitamin  C, Multivitamin, Biotin, Citracal,  Can you manage your medications?: Yes  Are you currently taking any opioid medications?: Yes  Can you walk and transfer into and out of your bed and chair?: Yes  Can you dress and groom yourself?: Yes  Can you bathe or shower yourself?: Yes  Can you feed yourself?: Yes  Can you do your laundry/ housekeeping?: Yes  Can you manage your money, pay your bills, and track your expenses?: Yes  Can you make your own meals?: Yes  Can you do your own shopping?: Yes  Within the last 12 months, have you had any hospitalizations or Emergency Department visits?: No  Do you have a living will?: Yes  Do you have a Durable POA (Power of ) for healthcare decisions?: Yes  Do you have an Advanced Directive for end of life decisions?: Yes  How often have you used an illegal drug (including marijuana) or a prescription medication for non-medical reasons in the past year?: never  What is the typical number of drinks you consume in a day?: 0  What is the typical number of drinks you consume in a week?: 1  How often did you have a drink containing alcohol in the past year?: monthly or " less  How many drinks did you have on a typical day  when you were drinking in the past year?: 1 to 2  How often did you have 6 or more drinks on one occasion in the past year?: never

## 2024-07-16 NOTE — PATIENT INSTRUCTIONS
Medicare Preventive Visit Patient Instructions  Thank you for completing your Welcome to Medicare Visit or Medicare Annual Wellness Visit today. Your next wellness visit will be due in one year (7/17/2025).  The screening/preventive services that you may require over the next 5-10 years are detailed below. Some tests may not apply to you based off risk factors and/or age. Screening tests ordered at today's visit but not completed yet may show as past due. Also, please note that scanned in results may not display below.  Preventive Screenings:  Service Recommendations Previous Testing/Comments   Colorectal Cancer Screening  * Colonoscopy    * Fecal Occult Blood Test (FOBT)/Fecal Immunochemical Test (FIT)  * Fecal DNA/Cologuard Test  * Flexible Sigmoidoscopy Age: 45-75 years old   Colonoscopy: every 10 years (may be performed more frequently if at higher risk)  OR  FOBT/FIT: every 1 year  OR  Cologuard: every 3 years  OR  Sigmoidoscopy: every 5 years  Screening may be recommended earlier than age 45 if at higher risk for colorectal cancer. Also, an individualized decision between you and your healthcare provider will decide whether screening between the ages of 76-85 would be appropriate. Colonoscopy: 02/15/2021  FOBT/FIT: Not on file  Cologuard: Not on file  Sigmoidoscopy: Not on file    Screening Current     Breast Cancer Screening Age: 40+ years old  Frequency: every 1-2 years  Not required if history of left and right mastectomy Mammogram: 01/02/2024    Screening Current   Cervical Cancer Screening Between the ages of 21-29, pap smear recommended once every 3 years.   Between the ages of 30-65, can perform pap smear with HPV co-testing every 5 years.   Recommendations may differ for women with a history of total hysterectomy, cervical cancer, or abnormal pap smears in past. Pap Smear: 10/09/2023    Screening Not Indicated   Hepatitis C Screening Once for adults born between 1945 and 1965  More frequently in  patients at high risk for Hepatitis C Hep C Antibody: 06/12/2020    Screening Current   Diabetes Screening 1-2 times per year if you're at risk for diabetes or have pre-diabetes Fasting glucose: No results in last 5 years (No results in last 5 years)  A1C: No results in last 5 years (No results in last 5 years)  Screening Current   Cholesterol Screening Once every 5 years if you don't have a lipid disorder. May order more often based on risk factors. Lipid panel: 07/25/2023    Screening Not Indicated  History Lipid Disorder     Other Preventive Screenings Covered by Medicare:  Abdominal Aortic Aneurysm (AAA) Screening: covered once if your at risk. You're considered to be at risk if you have a family history of AAA.  Lung Cancer Screening: covers low dose CT scan once per year if you meet all of the following conditions: (1) Age 55-77; (2) No signs or symptoms of lung cancer; (3) Current smoker or have quit smoking within the last 15 years; (4) You have a tobacco smoking history of at least 20 pack years (packs per day multiplied by number of years you smoked); (5) You get a written order from a healthcare provider.  Glaucoma Screening: covered annually if you're considered high risk: (1) You have diabetes OR (2) Family history of glaucoma OR (3)  aged 50 and older OR (4)  American aged 65 and older  Osteoporosis Screening: covered every 2 years if you meet one of the following conditions: (1) You're estrogen deficient and at risk for osteoporosis based off medical history and other findings; (2) Have a vertebral abnormality; (3) On glucocorticoid therapy for more than 3 months; (4) Have primary hyperparathyroidism; (5) On osteoporosis medications and need to assess response to drug therapy.   Last bone density test (DXA Scan): 01/04/2024.  HIV Screening: covered annually if you're between the age of 15-65. Also covered annually if you are younger than 15 and older than 65 with risk factors  for HIV infection. For pregnant patients, it is covered up to 3 times per pregnancy.    Immunizations:  Immunization Recommendations   Influenza Vaccine Annual influenza vaccination during flu season is recommended for all persons aged >= 6 months who do not have contraindications   Pneumococcal Vaccine   * Pneumococcal conjugate vaccine = PCV13 (Prevnar 13), PCV15 (Vaxneuvance), PCV20 (Prevnar 20)  * Pneumococcal polysaccharide vaccine = PPSV23 (Pneumovax) Adults 19-63 yo with certain risk factors or if 65+ yo  If never received any pneumonia vaccine: recommend Prevnar 20 (PCV20)  Give PCV20 if previously received 1 dose of PCV13 or PPSV23   Hepatitis B Vaccine 3 dose series if at intermediate or high risk (ex: diabetes, end stage renal disease, liver disease)   Respiratory syncytial virus (RSV) Vaccine - COVERED BY MEDICARE PART D  * RSVPreF3 (Arexvy) CDC recommends that adults 60 years of age and older may receive a single dose of RSV vaccine using shared clinical decision-making (SCDM)   Tetanus (Td) Vaccine - COST NOT COVERED BY MEDICARE PART B Following completion of primary series, a booster dose should be given every 10 years to maintain immunity against tetanus. Td may also be given as tetanus wound prophylaxis.   Tdap Vaccine - COST NOT COVERED BY MEDICARE PART B Recommended at least once for all adults. For pregnant patients, recommended with each pregnancy.   Shingles Vaccine (Shingrix) - COST NOT COVERED BY MEDICARE PART B  2 shot series recommended in those 19 years and older who have or will have weakened immune systems or those 50 years and older     Health Maintenance Due:      Topic Date Due   • Breast Cancer Screening: Mammogram  01/02/2025   • Cervical Cancer Screening  06/01/2026   • Colorectal Cancer Screening  02/15/2031   • Hepatitis C Screening  Completed     Immunizations Due:      Topic Date Due   • Pneumococcal Vaccine: 65+ Years (3 of 3 - PPSV23 or PCV20) 08/17/2021   • COVID-19 Vaccine  (6 - 2023-24 season) 12/20/2023   • Influenza Vaccine (1) 09/01/2024     Advance Directives   What are advance directives?  Advance directives are legal documents that state your wishes and plans for medical care. These plans are made ahead of time in case you lose your ability to make decisions for yourself. Advance directives can apply to any medical decision, such as the treatments you want, and if you want to donate organs.   What are the types of advance directives?  There are many types of advance directives, and each state has rules about how to use them. You may choose a combination of any of the following:  Living will:  This is a written record of the treatment you want. You can also choose which treatments you do not want, which to limit, and which to stop at a certain time. This includes surgery, medicine, IV fluid, and tube feedings.   Durable power of  for healthcare (DPAHC):  This is a written record that states who you want to make healthcare choices for you when you are unable to make them for yourself. This person, called a proxy, is usually a family member or a friend. You may choose more than 1 proxy.  Do not resuscitate (DNR) order:  A DNR order is used in case your heart stops beating or you stop breathing. It is a request not to have certain forms of treatment, such as CPR. A DNR order may be included in other types of advance directives.  Medical directive:  This covers the care that you want if you are in a coma, near death, or unable to make decisions for yourself. You can list the treatments you want for each condition. Treatment may include pain medicine, surgery, blood transfusions, dialysis, IV or tube feedings, and a ventilator (breathing machine).  Values history:  This document has questions about your views, beliefs, and how you feel and think about life. This information can help others choose the care that you would choose.  Why are advance directives important?  An  advance directive helps you control your care. Although spoken wishes may be used, it is better to have your wishes written down. Spoken wishes can be misunderstood, or not followed. Treatments may be given even if you do not want them. An advance directive may make it easier for your family to make difficult choices about your care.   Narcotic (Opioid) Safety    Use narcotics safely:  Take prescribed narcotics exactly as directed  Do not give narcotics to others or take narcotics that belong to someone else  Do not mix narcotics without medicines or alcohol  Do not drive or operate heavy machinery after you take the narcotic  Monitor for side effects and notify your healthcare provider if you experienced side effects such as nausea, sleepiness, itching, or trouble thinking clearly.    Manage constipation:    Constipation is the most common side effect of narcotic medicine. Constipation is when you have hard, dry bowel movements, or you go longer than usual between bowel movements. Tell your healthcare provider about all changes in your bowel movements while you are taking narcotics. He or she may recommend laxative medicine to help you have a bowel movement. He or she may also change the kind of narcotic you are taking, or change when you take it. The following are more ways you can prevent or relieve constipation:    Drink liquids as directed.  You may need to drink extra liquids to help soften and move your bowels. Ask how much liquid to drink each day and which liquids are best for you.  Eat high-fiber foods.  This may help decrease constipation by adding bulk to your bowel movements. High-fiber foods include fruits, vegetables, whole-grain breads and cereals, and beans. Your healthcare provider or dietitian can help you create a high-fiber meal plan. Your provider may also recommend a fiber supplement if you cannot get enough fiber from food.  Exercise regularly.  Regular physical activity can help stimulate  your intestines. Walking is a good exercise to prevent or relieve constipation. Ask which exercises are best for you.  Schedule a time each day to have a bowel movement.  This may help train your body to have regular bowel movements. Bend forward while you are on the toilet to help move the bowel movement out. Sit on the toilet for at least 10 minutes, even if you do not have a bowel movement.    Store narcotics safely:   Store narcotics where others cannot easily get them.  Keep them in a locked cabinet or secure area. Do not  keep them in a purse or other bag you carry with you. A person may be looking for something else and find the narcotics.  Make sure narcotics are stored out of the reach of children.  A child can easily overdose on narcotics. Narcotics may look like candy to a small child.    The best way to dispose of narcotics:      The laws vary by country and area. In the [place], the best way is to return the narcotics through a take-back program. This program is offered by the [place] Drug Enforcement Agency (PHNO[employer]). The following are options for using the program:  Take the narcotics to a [facility] collection site.  The site is often a law enforcement center. Call your local law enforcement center for scheduled take-back days in your area. You will be given information on where to go if the collection site is in a different location.  Take the narcotics to an approved pharmacy or hospital.  A pharmacy or hospital may be set up as a collection site. You will need to ask if it is a [facility]ONG collection site if you were not directed there. A pharmacy or doctor's office may not be able to take back narcotics unless it is a PHONG site.  Use a mail-back system.  This means you are given containers to put the narcotics into. You will then mail them in the containers.  Use a take-back drop box.  This is a place to leave the narcotics at any time. People and animals will not be able to get into the box. Your local law  enforcement agency can tell you where to find a drop box in your area.    Other ways to manage pain:   Ask your healthcare provider about non-narcotic medicines to control pain.  Nonprescription medicines include NSAIDs (such as ibuprofen) and acetaminophen. Prescription medicines include muscle relaxers, antidepressants, and steroids.  Pain may be managed without any medicines.  Some ways to relieve pain include massage, aromatherapy, or meditation. Physical or occupational therapy may also help.    For more information:   Drug Enforcement Administration  [address] [place] [address]  Phone: 0- 711 - 920-5125  Web Address: https://www.deadiversion.[employer].gov/drug_disposal/    [place] Food and Drug Administration  [address] [place] [address]  Phone: 5- 669 - 118-4602  Web Address: http://www.fda.gov     © Copyright [employer] 2018 Information is for End User's use only and may not be sold, redistributed or otherwise used for commercial purposes. All illustrations and images included in CareNotes® are the copyrighted property of [employer] or [employer]

## 2024-07-16 NOTE — PROGRESS NOTES
Ambulatory Visit  Name: Joy Roberts      : 1955      MRN: [id number]  Encounter Provider: Isreal Feng DO  Encounter Date: 2024   Encounter department: [facility]    Assessment & Plan   1. Medicare annual wellness visit, subsequent  2. Hypothyroidism, unspecified type  -     TSH, 3rd generation with Free T4 reflex; Future  3. Hypercholesterolemia  -     Lipid Panel with Direct LDL reflex; Future  4. Rheumatoid arthritis, involving unspecified site, unspecified whether rheumatoid factor present (HCC)  5. Continuous opioid dependence (HCC)  6. Vitamin D deficiency  -     Vitamin D 25 hydroxy; Future      Depression Screening and Follow-up Plan: Patient was screened for depression during today's encounter. They screened negative with a PHQ-2 score of 0.      Preventive health issues were discussed with patient, and age appropriate screening tests were ordered as noted in patient's After Visit Summary. Personalized health advice and appropriate referrals for health education or preventive services given if needed, as noted in patient's After Visit Summary.    History of Present Illness     HPI   Patient Care Team:  Isreal Feng DO as PCP - General  Isreal Feng DO as PCP - PCP-[facility] ([facility])    Review of Systems  Medical History Reviewed by provider this encounter:  Tobacco  Allergies  Meds  Problems  Med Hx  Surg Hx  Fam Hx       Annual Wellness Visit Questionnaire   Joy is here for her Subsequent Wellness visit. Last Medicare Wellness visit information reviewed, patient interviewed and updates made to the record today.      Health Risk Assessment:   Patient rates overall health as good. Patient feels that their physical health rating is slightly worse. Patient is satisfied with their life. Eyesight was rated as same. Hearing was rated as same. Patient feels that their emotional and mental health rating is same. Patients states they are never, rarely  angry. Patient states they are sometimes unusually tired/fatigued. Pain experienced in the last 7 days has been some. Patient's pain rating has been 4/10. Patient states that she has experienced no weight loss or gain in last 6 months.     Depression Screening:   PHQ-2 Score: 0      Fall Risk Screening:   In the past year, patient has experienced: no history of falling in past year      Urinary Incontinence Screening:   Patient has not leaked urine accidently in the last six months.     Home Safety:  Patient has trouble with stairs inside or outside of their home. Patient has working smoke alarms and has no working carbon monoxide detector. Home safety hazards include: none.     Nutrition:   Current diet is Regular.     Medications:   Patient is currently taking over-the-counter supplements. OTC medications include: see medication list. Patient is able to manage medications.     Activities of Daily Living (ADLs)/Instrumental Activities of Daily Living (IADLs):   Walk and transfer into and out of bed and chair?: Yes  Dress and groom yourself?: Yes    Bathe or shower yourself?: Yes    Feed yourself? Yes  Do your laundry/housekeeping?: Yes  Manage your money, pay your bills and track your expenses?: Yes  Make your own meals?: Yes    Do your own shopping?: Yes    Previous Hospitalizations:   Any hospitalizations or ED visits within the last 12 months?: No      Advance Care Planning:   Living will: Yes    Durable POA for healthcare: Yes    Advanced directive: Yes      Cognitive Screening:   Provider or family/friend/caregiver concerned regarding cognition?: No    PREVENTIVE SCREENINGS      Cardiovascular Screening:    General: Screening Not Indicated, History Lipid Disorder and Risks and Benefits Discussed    Due for: Lipid Panel      Diabetes Screening:     General: Screening Current and Risks and Benefits Discussed      Colorectal Cancer Screening:     General: Screening Current      Breast Cancer Screening:      General: Screening Current and Risks and Benefits Discussed      Cervical Cancer Screening:    General: Screening Not Indicated and Risks and Benefits Discussed      Osteoporosis Screening:    General: Risks and Benefits Discussed and Screening Current      Abdominal Aortic Aneurysm (AAA) Screening:        General: Risks and Benefits Discussed and Screening Not Indicated      Lung Cancer Screening:     General: Screening Not Indicated and Risks and Benefits Discussed      Hepatitis C Screening:    General: Screening Current and Risks and Benefits Discussed    Screening, Brief Intervention, and Referral to Treatment (SBIRT)    Screening  Typical number of drinks in a day: 0  Typical number of drinks in a week: 1  Interpretation: Low risk drinking behavior.    AUDIT-C Screenin) How often did you have a drink containing alcohol in the past year? monthly or less  2) How many drinks did you have on a typical day when you were drinking in the past year? 1 to 2  3) How often did you have 6 or more drinks on one occasion in the past year? never    AUDIT-C Score: 1  Interpretation: Score 0-2 (female): Negative screen for alcohol misuse    Single Item Drug Screening:  How often have you used an illegal drug (including marijuana) or a prescription medication for non-medical reasons in the past year? never    Single Item Drug Screen Score: 0  Interpretation: Negative screen for possible drug use disorder    Brief Intervention  Alcohol & drug use screenings were reviewed. No concerns regarding substance use disorder identified. Healthy alcohol use/limits discussed.     Review of Current Opioid Use    Opioid Risk Tool (ORT) Interpretation: Complete Opioid Risk Tool (ORT)    Other Counseling Topics:   Car/seat belt/driving safety, skin self-exam, sunscreen and calcium and vitamin D intake and regular weightbearing exercise.     Social Determinants of Health     Financial Resource Strain: Low Risk  (2023)    Overall  "Financial Resource Strain (CARDIA)     Difficulty of Paying Living Expenses: Not hard at all   Food Insecurity: No Food Insecurity (7/16/2024)    Hunger Vital Sign     Worried About Running Out of Food in the Last Year: Never true     Ran Out of Food in the Last Year: Never true   Transportation Needs: No Transportation Needs (7/16/2024)    PRAPARE - Transportation     Lack of Transportation (Medical): No     Lack of Transportation (Non-Medical): No   Housing Stability: Low Risk  (7/16/2024)    Housing Stability Vital Sign     Unable to Pay for Housing in the Last Year: No     Number of Times Moved in the Last Year: 1     Homeless in the Last Year: No   Utilities: Not At Risk (7/16/2024)    [facility] Utilities     Threatened with loss of utilities: No     No results found.    Objective     /70   Pulse 84   Temp (!) 97.1 °F (36.2 °C)   Resp 16   Ht 5' 3\" (1.6 m)   Wt 54.5 kg (120 lb 3.2 oz)   LMP 05/18/2006   SpO2 99%   BMI 21.29 kg/m²     Physical Exam      "

## 2024-09-18 DIAGNOSIS — E03.9 HYPOTHYROIDISM, UNSPECIFIED TYPE: ICD-10-CM

## 2024-09-18 RX ORDER — LEVOTHYROXINE SODIUM 50 MCG
TABLET ORAL
Qty: 30 TABLET | Refills: 0 | Status: SHIPPED | OUTPATIENT
Start: 2024-09-18

## 2024-10-09 LAB
25(OH)D3+25(OH)D2 SERPL-MCNC: 48 NG/ML (ref 30–100)
CHOLEST SERPL-MCNC: 235 MG/DL
CHOLEST/HDLC SERPL: 2.3 {RATIO}
HDLC SERPL-MCNC: 101 MG/DL (ref 23–92)
LDLC SERPL CALC-MCNC: 120 MG/DL
NONHDLC SERPL-MCNC: 134 MG/DL
TRIGL SERPL-MCNC: 68 MG/DL
TSH SERPL-ACNC: 1.25 UIU/ML (ref 0.45–5.33)

## 2024-10-10 DIAGNOSIS — E03.9 HYPOTHYROIDISM, UNSPECIFIED TYPE: ICD-10-CM

## 2024-10-10 RX ORDER — LEVOTHYROXINE SODIUM 50 MCG
TABLET ORAL
Qty: 30 TABLET | Refills: 5 | Status: SHIPPED | OUTPATIENT
Start: 2024-10-10 | End: 2024-10-12 | Stop reason: SDUPTHER

## 2024-10-12 DIAGNOSIS — E03.9 HYPOTHYROIDISM, UNSPECIFIED TYPE: ICD-10-CM

## 2024-10-12 RX ORDER — LEVOTHYROXINE SODIUM 50 MCG
50 TABLET ORAL DAILY
Qty: 90 TABLET | Refills: 3 | Status: SHIPPED | OUTPATIENT
Start: 2024-10-12

## 2024-10-14 ENCOUNTER — ANNUAL EXAM (OUTPATIENT)
Dept: OBGYN CLINIC | Facility: MEDICAL CENTER | Age: 69
End: 2024-10-14
Payer: COMMERCIAL

## 2024-10-14 VITALS
SYSTOLIC BLOOD PRESSURE: 126 MMHG | HEIGHT: 64 IN | WEIGHT: 120.4 LBS | BODY MASS INDEX: 20.55 KG/M2 | DIASTOLIC BLOOD PRESSURE: 80 MMHG

## 2024-10-14 DIAGNOSIS — N89.8 VAGINAL IRRITATION: ICD-10-CM

## 2024-10-14 DIAGNOSIS — M85.852 OSTEOPENIA OF LEFT HIP: ICD-10-CM

## 2024-10-14 DIAGNOSIS — Z12.11 COLON CANCER SCREENING: ICD-10-CM

## 2024-10-14 DIAGNOSIS — Z01.419 WELL WOMAN EXAM WITH ROUTINE GYNECOLOGICAL EXAM: Primary | ICD-10-CM

## 2024-10-14 DIAGNOSIS — Z12.31 BREAST CANCER SCREENING BY MAMMOGRAM: ICD-10-CM

## 2024-10-14 PROCEDURE — G0101 CA SCREEN;PELVIC/BREAST EXAM: HCPCS | Performed by: OBSTETRICS & GYNECOLOGY

## 2024-10-14 RX ORDER — TRIAMCINOLONE ACETONIDE 5 MG/G
OINTMENT TOPICAL 2 TIMES DAILY PRN
Qty: 30 G | Refills: 0 | Status: SHIPPED | OUTPATIENT
Start: 2024-10-14

## 2024-10-14 NOTE — PROGRESS NOTES
Assessment   68 y.o. postmenopausal female presenting for annual exam.     Plan   Diagnoses and all orders for this visit:    Well woman exam with routine gynecological exam  - Pap up to date  - Mammo due January  - DEXA up to date  - Colonoscopy up to date  - Return in 1-2yr for GYN exam    Breast cancer screening by mammogram  -     Mammo screening bilateral w 3d and cad; Future    Colon cancer screening  - Up to date    Osteopenia of left hip  - Follows with rheumatology    Vaginal irritation  -     triamcinolone (KENALOG) 0.5 % ointment; Apply topically 2 (two) times a day as needed for irritation    __________________________________________________________________      Subjective     68 y.o. postmenopausal female presenting for annual exam.     Still intermittent itching, steroid cream helps for maintenance.      GYN  Complaints: denies  Denies dyspareunia, genital discharge, genital ulcers, pelvic pain and vulvar/vaginal symptoms  Menopause occurred at age 50-55. She has had no bleeding since this time.  Menopausal symptoms: none  Sexually active: Yes - single partner - male  Hx STI: denies   Hx Abnormal pap: denies  Last pap:  - NILM, HPV neg     OB   ( x2, c/s x1, 9lb 4oz)  Pregnancy complications: miscarriages, needed vaginal suppositories in preg       Complaints: denies  Denies urinary frequency, hematuria, urinary incontinence and dysuria     BREAST  Complaints: denies  Denies: breast lump, breast tenderness, changed mole, dryness, nipple discharge, pruritus, rash, skin color change and skin lesion(s)  Last mammogram:  - birads2  Personal hx: dense  Family hx: denies fhx of breast, uterine, ovarian, or colon cancers  Patient does do regular self-exams     GENERAL  PMH reviewed/updated and is as below.   Patient does follow with a PCP.  Retired.  Denies domestic violence.  Exercise: walks dog 2hr/day, stationary bike  Diet: lower dairy intake     SCREENING  Cervical Ca: pap up to  date; ASCCP guidelines discussed  Breast Ca: mammo ordered  Colon Ca:  - colonoscopy - repeat 7yr   Metabolic:  - DEXA - osteopenia (stable and improved from osteoporosis)        Past Medical History:   Diagnosis Date    Allergic     Allergic rhinitis     Arthritis     Disease of thyroid gland     DVT, lower extremity (HCC)     Right leg- was a smoker and on birth control    Ear problems     Fibromyalgia     History of transfusion     Post-op knee replacement. No adverse reaction    Hypothyroidism     Mass     Left foot    Miscarriage     Peptic ulceration     perforated 3/2022    PONV (postoperative nausea and vomiting)     RA (rheumatoid arthritis) (HCC)     Wears glasses     For reading       Past Surgical History:   Procedure Laterality Date     SECTION      x1    COLONOSCOPY  Febuary     CYST REMOVAL Left     excision - Baker's cyst - knee     EXPLORATORY LAPAROTOMY  2022    perf peptic ulcer. s/p ex lap, luis alfredo patch repair, EGD    FRACTURE SURGERY Right     Right ankle is fused. Has screws    JOINT REPLACEMENT Bilateral     TKR's. Lt- x1; Rt-x1 and then revision    KNEE ARTHROSCOPY Bilateral     NV EXC TUMOR SOFT TISSUE FOOT/TOE SUBFASC <1.5CM Left 10/30/2018    Procedure: FOOT SOFT TISSUE MASS EXCISION;  Surgeon: Yaritza Strong DPM;  Location: [facility];  Service: Podiatry    TUBAL LIGATION      WISDOM TOOTH EXTRACTION           Current Outpatient Medications:     Adalimumab (Humira) 40 MG/0.4ML PSKT, 0.4 mL, Disp: , Rfl:     Ascorbic Acid (VITAMIN C) 1000 MG tablet, Take 1,000 mg by mouth daily, Disp: , Rfl:     Biotin 58683 MCG TABS, Take by mouth, Disp: , Rfl:     cyclobenzaprine (FLEXERIL) 10 mg tablet, TAKE 1 TABLET BY MOUTH DAILY AT  BEDTIME, Disp: 90 tablet, Rfl: 1    cycloSPORINE (RESTASIS) 0.05 % ophthalmic emulsion, Apply 1 drop to eye, Disp: , Rfl:     folic acid (FOLVITE) 1 mg tablet, Take 1 tablet by mouth daily, Disp: , Rfl:     hydrochlorothiazide  (HYDRODIURIL) 25 mg tablet, Take 1 tablet by mouth daily as needed, Disp: , Rfl:     ketoconazole (NIZORAL) 2 % cream, , Disp: , Rfl:     levocetirizine (Xyzal Allergy 24HR) 5 MG tablet, , Disp: , Rfl:     methotrexate 25 MG/ML, Inject as directed Weekly- 0.6 ml , Disp: , Rfl:     Multiple Vitamins-Minerals (HAIR/SKIN/NAILS/BIOTIN) TABS, Take 1 tablet by mouth daily, Disp: , Rfl:     NON FORMULARY, Medical Marijuana Oral Edible, Disp: , Rfl:     nystatin (MYCOSTATIN) cream, Apply topically 2 (two) times a day, Disp: 30 g, Rfl: 0    oxyCODONE-acetaminophen (PERCOCET) 5-325 mg per tablet, Take 1 tablet by mouth every 4 (four) hours as needed , Disp: , Rfl:     pantoprazole (PROTONIX) 40 mg tablet, pantoprazole 40 mg tablet,delayed release, Disp: , Rfl:     predniSONE 5 mg tablet, Take 5 mg by mouth as needed, Disp: , Rfl: 2    Synthroid 50 MCG tablet, Take 1 tablet (50 mcg total) by mouth daily, Disp: 90 tablet, Rfl: 3    triamcinolone (KENALOG) 0.5 % ointment, Apply topically 2 (two) times a day as needed for irritation, Disp: 30 g, Rfl: 0    Cholecalciferol 2000 units CAPS, Take 1 capsule by mouth (Patient not taking: Reported on 2024), Disp: , Rfl:     Allergies   Allergen Reactions    Cephalexin Rash    Nuts - Food Allergy Hives and Swelling     Swelling in throat.  Poppy seeds also cause this reaction    Papaver Wheezing    Erythromycin GI Intolerance    Penicillins Rash     Pt unsure of how bad reaction was-  Occurred in childhood    Other Swelling     poppyseed    Ciprofloxacin Rash       Social History     Tobacco Use    Smoking status: Former     Current packs/day: 0.00     Average packs/day: 1 pack/day for 10.0 years (10.0 ttl pk-yrs)     Types: Cigarettes     Start date: 1977     Quit date: 1987     Years since quittin.8    Smokeless tobacco: Never   Vaping Use    Vaping status: Never Used   Substance Use Topics    Alcohol use: Not Currently     Comment: socially    Drug use: Yes      "Types: Marijuana, Oxycodone     Comment: medical marijuana card             Objective  /80   Ht 5' 4\" (1.626 m)   Wt 54.6 kg (120 lb 6.4 oz)   LMP 05/18/2006   BMI 20.67 kg/m²      Physical Exam:  Physical Exam  Exam conducted with a chaperone present.   Constitutional:       General: She is not in acute distress.     Appearance: Normal appearance. She is well-developed. She is not ill-appearing, toxic-appearing or diaphoretic.   HENT:      Head: Normocephalic and atraumatic.   Eyes:      General: No scleral icterus.        Right eye: No discharge.         Left eye: No discharge.      Conjunctiva/sclera: Conjunctivae normal.   Cardiovascular:      Rate and Rhythm: Normal rate.   Pulmonary:      Effort: Pulmonary effort is normal. No accessory muscle usage or respiratory distress.   Chest:   Breasts:     Breasts are symmetrical.      Right: No inverted nipple, mass, nipple discharge, skin change or tenderness.      Left: No inverted nipple, mass, nipple discharge, skin change or tenderness.   Abdominal:      General: There is no distension.      Palpations: Abdomen is soft. There is no mass.      Tenderness: There is no abdominal tenderness. There is no guarding or rebound.   Genitourinary:     General: Normal vulva.      Exam position: Lithotomy position.      Labia:         Right: No rash, tenderness or lesion.         Left: No rash, tenderness or lesion.       Urethra: No prolapse, urethral swelling or urethral lesion.      Vagina: No signs of injury. No vaginal discharge, erythema, tenderness, bleeding or lesions.      Cervix: No cervical motion tenderness, discharge, friability, lesion or erythema.      Uterus: Not enlarged, not fixed and not tender.       Adnexa:         Right: No mass, tenderness or fullness.          Left: No mass, tenderness or fullness.        Rectum: No external hemorrhoid. Normal anal tone.      Comments: Narrowed introitus, mild lower vulvar agglutination   Lymphadenopathy:     "  Upper Body:      Right upper body: No axillary or pectoral adenopathy.      Left upper body: No axillary or pectoral adenopathy.   Skin:     General: Skin is warm and dry.      Findings: No erythema or rash.   Neurological:      Mental Status: She is alert.   Psychiatric:         Mood and Affect: Mood normal.         Behavior: Behavior normal.         Thought Content: Thought content normal.         Judgment: Judgment normal.

## 2024-11-24 DIAGNOSIS — M06.9 RHEUMATOID ARTHRITIS (HCC): ICD-10-CM

## 2024-11-26 RX ORDER — CYCLOBENZAPRINE HCL 10 MG
10 TABLET ORAL
Qty: 90 TABLET | Refills: 1 | Status: SHIPPED | OUTPATIENT
Start: 2024-11-26

## 2025-01-15 ENCOUNTER — OFFICE VISIT (OUTPATIENT)
Dept: FAMILY MEDICINE CLINIC | Facility: CLINIC | Age: 70
End: 2025-01-15
Payer: COMMERCIAL

## 2025-01-15 VITALS
HEIGHT: 64 IN | WEIGHT: 120 LBS | HEART RATE: 90 BPM | SYSTOLIC BLOOD PRESSURE: 128 MMHG | DIASTOLIC BLOOD PRESSURE: 76 MMHG | TEMPERATURE: 97.2 F | BODY MASS INDEX: 20.49 KG/M2 | OXYGEN SATURATION: 92 %

## 2025-01-15 DIAGNOSIS — L03.115 CELLULITIS OF RIGHT LEG: Primary | ICD-10-CM

## 2025-01-15 DIAGNOSIS — M06.9 RHEUMATOID ARTHRITIS, INVOLVING UNSPECIFIED SITE, UNSPECIFIED WHETHER RHEUMATOID FACTOR PRESENT (HCC): ICD-10-CM

## 2025-01-15 PROBLEM — I82.A13: Status: ACTIVE | Noted: 2025-01-15

## 2025-01-15 PROBLEM — F11.20 CONTINUOUS OPIOID DEPENDENCE (HCC): Status: RESOLVED | Noted: 2022-07-05 | Resolved: 2025-01-15

## 2025-01-15 PROCEDURE — 99214 OFFICE O/P EST MOD 30 MIN: CPT | Performed by: FAMILY MEDICINE

## 2025-01-15 RX ORDER — SULFAMETHOXAZOLE AND TRIMETHOPRIM 800; 160 MG/1; MG/1
1 TABLET ORAL 2 TIMES DAILY
Qty: 14 TABLET | Refills: 0 | Status: SHIPPED | OUTPATIENT
Start: 2025-01-15 | End: 2025-01-22

## 2025-01-16 NOTE — PROGRESS NOTES
"Name: Joy Roberts      : 1955      MRN: [id number]  Encounter Provider: Collin Miller DO  Encounter Date: 1/15/2025   Encounter department: [facility]  :  Assessment & Plan  Cellulitis of right leg    Orders:    sulfamethoxazole-trimethoprim (BACTRIM DS) 800-160 mg per tablet; Take 1 tablet by mouth 2 (two) times a day for 7 days  Patient with superficial cellulitis.  Recommend follow-up with us if no improvement or worsening symptoms.  Side effect profile of medication reviewed.  Rheumatoid arthritis, involving unspecified site, unspecified whether rheumatoid factor present (HCC)                History of Present Illness     Patient developed an abrasion to the right anterior leg several days ago.  She has redness around the abrasion area.  Denies any fever sweats or chills.  Denies any drainage.  Abrasion is scabbed now.      Review of Systems   Constitutional: Negative.    HENT: Negative.     Eyes: Negative.    Respiratory: Negative.     Cardiovascular: Negative.    Gastrointestinal: Negative.    Endocrine: Negative.    Genitourinary: Negative.    Musculoskeletal: Negative.    Skin:  Positive for rash.   Allergic/Immunologic: Negative.    Neurological: Negative.    Hematological: Negative.    Psychiatric/Behavioral: Negative.         Objective   /76 (BP Location: Left arm, Patient Position: Sitting)   Pulse 90   Temp (!) 97.2 °F (36.2 °C)   Ht 5' 4\" (1.626 m)   Wt 54.4 kg (120 lb)   LMP 2006   SpO2 92%   BMI 20.60 kg/m²      Physical Exam  Constitutional:       General: She is not in acute distress.     Appearance: She is well-developed. She is not diaphoretic.   HENT:      Head: Normocephalic and atraumatic.      Right Ear: External ear normal.      Left Ear: External ear normal.      Nose: Nose normal.      Mouth/Throat:      Pharynx: Oropharynx is clear.   Eyes:      General: No scleral icterus.        Right eye: No discharge.         Left eye: No discharge. "      Conjunctiva/sclera: Conjunctivae normal.      Pupils: Pupils are equal, round, and reactive to light.   Neck:      Thyroid: No thyromegaly.      Vascular: No JVD.      Trachea: No tracheal deviation.   Cardiovascular:      Rate and Rhythm: Normal rate and regular rhythm.      Heart sounds: Normal heart sounds. No murmur heard.     No friction rub. No gallop.   Pulmonary:      Effort: Pulmonary effort is normal. No respiratory distress.      Breath sounds: Normal breath sounds. No wheezing or rales.   Chest:      Chest wall: No tenderness.   Abdominal:      General: Bowel sounds are normal. There is no distension.      Palpations: Abdomen is soft. There is no mass.      Tenderness: There is no abdominal tenderness. There is no guarding or rebound.      Hernia: No hernia is present.   Musculoskeletal:         General: No tenderness or deformity. Normal range of motion.      Cervical back: Normal range of motion and neck supple.   Lymphadenopathy:      Cervical: No cervical adenopathy.   Skin:     General: Skin is warm and dry.      Capillary Refill: Capillary refill takes less than 2 seconds.      Coloration: Skin is not pale.      Findings: Rash (Scabbed area to the right anterior leg with some surrounding erythema.) present. No erythema.   Neurological:      Mental Status: She is alert and oriented to person, place, and time.      Cranial Nerves: No cranial nerve deficit.      Sensory: No sensory deficit.      Motor: No abnormal muscle tone.      Coordination: Coordination normal.      Deep Tendon Reflexes: Reflexes normal.   Psychiatric:         Mood and Affect: Mood normal.         Behavior: Behavior normal.

## 2025-02-04 ENCOUNTER — HOSPITAL ENCOUNTER (OUTPATIENT)
Dept: RADIOLOGY | Facility: IMAGING CENTER | Age: 70
Discharge: HOME | End: 2025-02-04
Payer: COMMERCIAL

## 2025-02-04 VITALS — BODY MASS INDEX: 20.49 KG/M2 | WEIGHT: 120 LBS | HEIGHT: 64 IN

## 2025-02-04 DIAGNOSIS — Z12.31 BREAST CANCER SCREENING BY MAMMOGRAM: ICD-10-CM

## 2025-02-04 PROCEDURE — 77067 SCR MAMMO BI INCL CAD: CPT

## 2025-02-04 PROCEDURE — 77063 BREAST TOMOSYNTHESIS BI: CPT

## 2025-02-10 ENCOUNTER — RESULTS FOLLOW-UP (OUTPATIENT)
Dept: OBGYN CLINIC | Facility: MEDICAL CENTER | Age: 70
End: 2025-02-10

## 2025-04-17 ENCOUNTER — TELEPHONE (OUTPATIENT)
Dept: FAMILY MEDICINE CLINIC | Facility: CLINIC | Age: 70
End: 2025-04-17

## 2025-04-17 NOTE — TELEPHONE ENCOUNTER
[facility]  can take 10-14 business day to be completed once completed chart will be updated

## 2025-05-03 DIAGNOSIS — E03.9 HYPOTHYROIDISM, UNSPECIFIED TYPE: ICD-10-CM

## 2025-05-04 RX ORDER — LEVOTHYROXINE SODIUM 50 MCG
50 TABLET ORAL DAILY
Qty: 100 TABLET | Refills: 2 | Status: SHIPPED | OUTPATIENT
Start: 2025-05-04

## 2025-05-27 DIAGNOSIS — M06.9 RHEUMATOID ARTHRITIS (HCC): ICD-10-CM

## 2025-05-28 RX ORDER — CYCLOBENZAPRINE HCL 10 MG
10 TABLET ORAL
Qty: 90 TABLET | Refills: 0 | Status: SHIPPED | OUTPATIENT
Start: 2025-05-28

## 2025-06-19 ENCOUNTER — RA CDI HCC (OUTPATIENT)
Dept: OTHER | Facility: HOSPITAL | Age: 70
End: 2025-06-19

## 2025-06-19 NOTE — PROGRESS NOTES
HCC coding opportunities       Chart reviewed, no opportunity found: CHART REVIEWED, NO OPPORTUNITY FOUND   DM - no evidence     [employer] AUDIT     Patients Insurance     Medicare Insurance: [employer] Advantage

## 2025-07-10 LAB
25(OH)D3+25(OH)D2 SERPL-MCNC: 46 NG/ML (ref 30–100)
CHOLEST SERPL-MCNC: 232 MG/DL
CHOLEST/HDLC SERPL: 3.1 {RATIO}
HDLC SERPL-MCNC: 75 MG/DL (ref 23–92)
LDLC SERPL CALC-MCNC: 138 MG/DL
NONHDLC SERPL-MCNC: 157 MG/DL
TRIGL SERPL-MCNC: 95 MG/DL
TSH SERPL-ACNC: 1.18 UIU/ML (ref 0.45–5.33)

## 2025-07-17 ENCOUNTER — OFFICE VISIT (OUTPATIENT)
Dept: FAMILY MEDICINE CLINIC | Facility: CLINIC | Age: 70
End: 2025-07-17
Payer: COMMERCIAL

## 2025-07-17 VITALS
HEIGHT: 64 IN | TEMPERATURE: 97.5 F | SYSTOLIC BLOOD PRESSURE: 110 MMHG | BODY MASS INDEX: 20.25 KG/M2 | WEIGHT: 118.6 LBS | RESPIRATION RATE: 16 BRPM | DIASTOLIC BLOOD PRESSURE: 70 MMHG | HEART RATE: 88 BPM | OXYGEN SATURATION: 99 %

## 2025-07-17 DIAGNOSIS — E03.9 HYPOTHYROIDISM, UNSPECIFIED TYPE: ICD-10-CM

## 2025-07-17 DIAGNOSIS — E55.9 VITAMIN D DEFICIENCY: ICD-10-CM

## 2025-07-17 DIAGNOSIS — E78.00 HYPERCHOLESTEROLEMIA: ICD-10-CM

## 2025-07-17 DIAGNOSIS — M06.9 RHEUMATOID ARTHRITIS INVOLVING MULTIPLE SITES, UNSPECIFIED WHETHER RHEUMATOID FACTOR PRESENT (HCC): ICD-10-CM

## 2025-07-17 DIAGNOSIS — Z00.00 MEDICARE ANNUAL WELLNESS VISIT, SUBSEQUENT: Primary | ICD-10-CM

## 2025-07-17 PROCEDURE — 99214 OFFICE O/P EST MOD 30 MIN: CPT | Performed by: FAMILY MEDICINE

## 2025-07-17 PROCEDURE — G0439 PPPS, SUBSEQ VISIT: HCPCS | Performed by: FAMILY MEDICINE

## 2025-07-17 RX ORDER — ROSUVASTATIN CALCIUM 5 MG/1
5 TABLET, COATED ORAL DAILY
Qty: 30 TABLET | Refills: 5 | Status: SHIPPED | OUTPATIENT
Start: 2025-07-17

## 2025-07-17 NOTE — PROGRESS NOTES
Name: Joy Roberts      : 1955      MRN: [id number]  Encounter Provider: Isreal Feng DO  Encounter Date: 2025   Encounter department: [facility]  :  Assessment & Plan  Medicare annual wellness visit, subsequent  Patient to continue present treatment.  Instructed to follow a low-fat and low-cholesterol diet and to get regular aerobic exercise up to 150 minutes/week as tolerated.  Continue water exercises.  Follow-up with specialist as scheduled.  Return to the office in 1 year.       Hypothyroidism, unspecified type  Clinically euthyroid and recent TSH within normal limits.  Continue Synthroid 50 mcg daily.       Hypercholesterolemia  Discussed treatment options and risks the benefits of statins.  Patient request starting on low-dose statin and will start rosuvastatin 5 mg daily.  Recheck fasting lipid panel and CMP in 3 months.  Follow a low-cholesterol low-fat diet.  Orders:    rosuvastatin (CRESTOR) 5 mg tablet; Take 1 tablet (5 mg total) by mouth daily    Lipid Panel with Direct LDL reflex; Future    Rheumatoid arthritis involving multiple sites, unspecified whether rheumatoid factor present (HCC)  Clinically stable.  Continue present treatment and follow-up with rheumatology as scheduled.       Vitamin D deficiency  Continue vitamin D supplement.         Depression Screening and Follow-up Plan: Patient was screened for depression during today's encounter. They screened negative with a PHQ-2 score of 0.        Preventive health issues were discussed with patient, and age appropriate screening tests were ordered as noted in patient's After Visit Summary. Personalized health advice and appropriate referrals for health education or preventive services given if needed, as noted in patient's After Visit Summary.    History of Present Illness     Patient is here for annual Medicare wellness exam and follow-up of chronic conditions.  Reviewed recent fasting labs which revealed  normal TSH and elevated cholesterol.  Patient request starting on a statin for elevated cholesterol as she has a strong family history of heart disease.  Patient discontinued smoking over 30 years ago.  Patient follows with rheumatologist every 3 months and has CBC and CMP every 3 months.  Patient recently saw pain management.  Patient is up-to-date on GYN exam, mammogram and colonoscopy.  Patient is feeling well overall and exercises in her swimming pool almost daily.  Patient follows with dermatologist yearly.    Thyroid Problem  Presents for follow-up visit. Symptoms include cold intolerance, constipation, dry skin, hair loss and leg swelling. Patient reports no anxiety, depressed mood, diaphoresis, diarrhea, fatigue, heat intolerance, hoarse voice, palpitations, tremors, visual change, weight gain or weight loss. The symptoms have been stable.      Patient Care Team:  Isreal Feng DO as PCP - General  Isreal Feng DO as PCP - PCP-[facility])    Review of Systems   Constitutional:  Negative for diaphoresis, fatigue, weight gain and weight loss.   HENT:  Negative for hoarse voice.    Cardiovascular:  Negative for palpitations.   Gastrointestinal:  Positive for constipation. Negative for diarrhea.   Endocrine: Positive for cold intolerance. Negative for heat intolerance.   Neurological:  Negative for tremors.   Psychiatric/Behavioral:  The patient is not nervous/anxious.      Medical History Reviewed by provider this encounter:  Allergies  Meds  Problems       Annual Wellness Visit Questionnaire   Joy is here for her Subsequent Wellness visit. Last Medicare Wellness visit information reviewed, patient interviewed and updates made to the record today.      Health Risk Assessment:   Patient rates overall health as good. Patient feels that their physical health rating is same. Patient is satisfied with their life. Eyesight was rated as same. Hearing was rated as same. Patient feels that their  emotional and mental health rating is same. Patients states they are sometimes angry. Patient states they are sometimes unusually tired/fatigued. Pain experienced in the last 7 days has been some. Patient's pain rating has been 4/10. Patient states that she has experienced no weight loss or gain in last 6 months.     Depression Screening:   PHQ-2 Score: 0      Fall Risk Screening:   In the past year, patient has experienced: no history of falling in past year      Urinary Incontinence Screening:   Patient has not leaked urine accidently in the last six months.     Home Safety:  Patient has trouble with stairs inside or outside of their home. Patient has working smoke alarms and has no working carbon monoxide detector. Home safety hazards include: none and unfamiliar surroundings. Home safety hazards include: none and unfamiliar surroundings.     Nutrition:   Current diet is Regular.     Medications:   Patient is currently taking over-the-counter supplements. OTC medications include: see medication list. Patient is able to manage medications.     Activities of Daily Living (ADLs)/Instrumental Activities of Daily Living (IADLs):   Walk and transfer into and out of bed and chair?: Yes  Dress and groom yourself?: Yes    Bathe or shower yourself?: Yes    Feed yourself? Yes  Do your laundry/housekeeping?: Yes  Manage your money, pay your bills and track your expenses?: Yes  Make your own meals?: Yes    Do your own shopping?: Yes    Previous Hospitalizations:   Any hospitalizations or ED visits within the last 12 months?: Yes    How many hospitalizations have you had in the last year?: 1-2    Hospitalization Comments: ER flu    Advance Care Planning:   Living will: Yes    Durable POA for healthcare: Yes    Advanced directive: Yes      Cognitive Screening:   Provider or family/friend/caregiver concerned regarding cognition?: No    Preventive Screenings      Cardiovascular Screening:    General: Screening Not Indicated,  History Lipid Disorder and Risks and Benefits Discussed      Diabetes Screening:     General: Screening Current and Risks and Benefits Discussed      Colorectal Cancer Screening:     General: Screening Current      Breast Cancer Screening:     General: Screening Current and Risks and Benefits Discussed      Cervical Cancer Screening:    General: Screening Not Indicated, Risks and Benefits Discussed and Screening Current      Osteoporosis Screening:    General: Risks and Benefits Discussed and Screening Current      Abdominal Aortic Aneurysm (AAA) Screening:        General: Risks and Benefits Discussed and Screening Not Indicated      Lung Cancer Screening:     General: Screening Not Indicated and Risks and Benefits Discussed      Hepatitis C Screening:    General: Screening Current and Risks and Benefits Discussed    Immunizations:  - Immunizations due: Prevnar 20 and Zoster (Shingrix)  - Risks/benefits immunizations discussed      Screening, Brief Intervention, and Referral to Treatment (SBIRT)     Screening  Typical number of drinks in a day: 0  Typical number of drinks in a week: 0  Interpretation: Low risk drinking behavior.    AUDIT-C Screenin) How often did you have a drink containing alcohol in the past year? monthly or less  2) How many drinks did you have on a typical day when you were drinking in the past year? 1 to 2  3) How often did you have 6 or more drinks on one occasion in the past year? never    AUDIT-C Score: 1  Interpretation: Score 0-2 (female): Negative screen for alcohol misuse    Single Item Drug Screening:  How often have you used an illegal drug (including marijuana) or a prescription medication for non-medical reasons in the past year? never    Single Item Drug Screen Score: 0  Interpretation: Negative screen for possible drug use disorder    Brief Intervention  Alcohol & drug use screenings were reviewed. No concerns regarding substance use disorder identified.     Review of Current  "Opioid Use    Opioid Risk Tool (ORT) Interpretation: Complete Opioid Risk Tool (ORT)    Other Counseling Topics:   Car/seat belt/driving safety, skin self-exam, sunscreen and calcium and vitamin D intake and regular weightbearing exercise.     Social Drivers of Health     Financial Resource Strain: Low Risk  (7/4/2023)    Overall Financial Resource Strain (CARDIA)     Difficulty of Paying Living Expenses: Not hard at all   Food Insecurity: No Food Insecurity (7/15/2025)    Nursing - Inadequate Food Risk Classification     Worried About Running Out of Food in the Last Year: Never true     Ran Out of Food in the Last Year: Never true   Transportation Needs: No Transportation Needs (7/15/2025)    PRAPARE - Transportation     Lack of Transportation (Medical): No     Lack of Transportation (Non-Medical): No   Housing Stability: Unknown (7/15/2025)    Housing Stability Vital Sign     Unable to Pay for Housing in the Last Year: No     Homeless in the Last Year: No   Utilities: Not At Risk (7/15/2025)    [facility] Utilities     Threatened with loss of utilities: No     No results found.    Objective   /70   Pulse 88   Temp 97.5 °F (36.4 °C)   Resp 16   Ht 5' 4\" (1.626 m)   Wt 53.8 kg (118 lb 9.6 oz)   LMP 05/18/2006   SpO2 99%   BMI 20.36 kg/m²     Physical Exam  Constitutional:       General: She is not in acute distress.     Appearance: Normal appearance.   HENT:      Head: Normocephalic.      Mouth/Throat:      Mouth: Mucous membranes are moist.     Eyes:      General: No scleral icterus.     Conjunctiva/sclera: Conjunctivae normal.     Neck:      Vascular: No carotid bruit.     Cardiovascular:      Rate and Rhythm: Normal rate and regular rhythm.   Pulmonary:      Effort: Pulmonary effort is normal.      Breath sounds: Normal breath sounds.   Abdominal:      Palpations: Abdomen is soft.      Tenderness: There is no abdominal tenderness.     Musculoskeletal:      Cervical back: Neck supple.      Right lower leg: " No edema.      Left lower leg: No edema.   Lymphadenopathy:      Cervical: No cervical adenopathy.     Skin:     General: Skin is warm and dry.     Neurological:      General: No focal deficit present.      Mental Status: She is alert and oriented to person, place, and time.     Psychiatric:         Mood and Affect: Mood normal.         Behavior: Behavior normal.         Thought Content: Thought content normal.         Judgment: Judgment normal.

## 2025-07-17 NOTE — ASSESSMENT & PLAN NOTE
Discussed treatment options and risks the benefits of statins.  Patient request starting on low-dose statin and will start rosuvastatin 5 mg daily.  Recheck fasting lipid panel and CMP in 3 months.  Follow a low-cholesterol low-fat diet.  Orders:    rosuvastatin (CRESTOR) 5 mg tablet; Take 1 tablet (5 mg total) by mouth daily    Lipid Panel with Direct LDL reflex; Future

## 2025-07-17 NOTE — PATIENT INSTRUCTIONS
Medicare Preventive Visit Patient Instructions  Thank you for completing your Welcome to Medicare Visit or Medicare Annual Wellness Visit today. Your next wellness visit will be due in one year (7/18/2026).  The screening/preventive services that you may require over the next 5-10 years are detailed below. Some tests may not apply to you based off risk factors and/or age. Screening tests ordered at today's visit but not completed yet may show as past due. Also, please note that scanned in results may not display below.  Preventive Screenings:  Service Recommendations Previous Testing/Comments   Colorectal Cancer Screening  * Colonoscopy    * Fecal Occult Blood Test (FOBT)/Fecal Immunochemical Test (FIT)  * Fecal DNA/Cologuard Test  * Flexible Sigmoidoscopy Age: 45-75 years old   Colonoscopy: every 10 years (may be performed more frequently if at higher risk)  OR  FOBT/FIT: every 1 year  OR  Cologuard: every 3 years  OR  Sigmoidoscopy: every 5 years  Screening may be recommended earlier than age 45 if at higher risk for colorectal cancer. Also, an individualized decision between you and your healthcare provider will decide whether screening between the ages of 76-85 would be appropriate. Colonoscopy: 02/15/2021  FOBT/FIT: Not on file  Cologuard: Not on file  Sigmoidoscopy: Not on file    Screening Current     Breast Cancer Screening Age: 40+ years old  Frequency: every 1-2 years  Not required if history of left and right mastectomy Mammogram: 02/04/2025    Screening Current  Risks and Benefits Discussed   Cervical Cancer Screening Between the ages of 21-29, pap smear recommended once every 3 years.   Between the ages of 30-65, can perform pap smear with HPV co-testing every 5 years.   Recommendations may differ for women with a history of total hysterectomy, cervical cancer, or abnormal pap smears in past. Pap Smear: 10/14/2024    Screening Not Indicated  Risks and Benefits Discussed  Screening Current   Hepatitis C  Screening Once for adults born between 1945 and 1965  More frequently in patients at high risk for Hepatitis C Hep C Antibody: 06/12/2020    Screening Current  Risks and Benefits Discussed   Diabetes Screening 1-2 times per year if you're at risk for diabetes or have pre-diabetes Fasting glucose: No results in last 5 years (No results in last 5 years)  A1C: No results in last 5 years (No results in last 5 years)  Screening Current  Risks and Benefits Discussed   Cholesterol Screening Once every 5 years if you don't have a lipid disorder. May order more often based on risk factors. Lipid panel: 07/10/2025    Screening Not Indicated  History Lipid Disorder  Risks and Benefits Discussed     Other Preventive Screenings Covered by Medicare:  Abdominal Aortic Aneurysm (AAA) Screening: covered once if your at risk. You're considered to be at risk if you have a family history of AAA.  Lung Cancer Screening: covers low dose CT scan once per year if you meet all of the following conditions: (1) Age 55-77; (2) No signs or symptoms of lung cancer; (3) Current smoker or have quit smoking within the last 15 years; (4) You have a tobacco smoking history of at least 20 pack years (packs per day multiplied by number of years you smoked); (5) You get a written order from a healthcare provider.  Glaucoma Screening: covered annually if you're considered high risk: (1) You have diabetes OR (2) Family history of glaucoma OR (3)  aged 50 and older OR (4)  American aged 65 and older  Osteoporosis Screening: covered every 2 years if you meet one of the following conditions: (1) You're estrogen deficient and at risk for osteoporosis based off medical history and other findings; (2) Have a vertebral abnormality; (3) On glucocorticoid therapy for more than 3 months; (4) Have primary hyperparathyroidism; (5) On osteoporosis medications and need to assess response to drug therapy.   Last bone density test (DXA Scan):  01/04/2024.  HIV Screening: covered annually if you're between the age of 15-65. Also covered annually if you are younger than 15 and older than 65 with risk factors for HIV infection. For pregnant patients, it is covered up to 3 times per pregnancy.    Immunizations:  Immunization Recommendations   Influenza Vaccine Annual influenza vaccination during flu season is recommended for all persons aged >= 6 months who do not have contraindications   Pneumococcal Vaccine   * Pneumococcal conjugate vaccine = PCV13 (Prevnar 13), PCV15 (Vaxneuvance), PCV20 (Prevnar 20)  * Pneumococcal polysaccharide vaccine = PPSV23 (Pneumovax) Adults 19-65 yo with certain risk factors or if 65+ yo  If never received any pneumonia vaccine: recommend Prevnar 20 (PCV20)  Give PCV20 if previously received 1 dose of PCV13 or PPSV23   Hepatitis B Vaccine 3 dose series if at intermediate or high risk (ex: diabetes, end stage renal disease, liver disease)   Respiratory syncytial virus (RSV) Vaccine - COVERED BY MEDICARE PART D  * RSVPreF3 (Arexvy) CDC recommends that adults 60 years of age and older may receive a single dose of RSV vaccine using shared clinical decision-making (SCDM)   Tetanus (Td) Vaccine - COST NOT COVERED BY MEDICARE PART B Following completion of primary series, a booster dose should be given every 10 years to maintain immunity against tetanus. Td may also be given as tetanus wound prophylaxis.   Tdap Vaccine - COST NOT COVERED BY MEDICARE PART B Recommended at least once for all adults. For pregnant patients, recommended with each pregnancy.   Shingles Vaccine (Shingrix) - COST NOT COVERED BY MEDICARE PART B  2 shot series recommended in those 19 years and older who have or will have weakened immune systems or those 50 years and older     Health Maintenance Due:      Topic Date Due   • Breast Cancer Screening: Mammogram  02/04/2026   • Colorectal Cancer Screening  02/15/2031   • Hepatitis C Screening  Completed      Immunizations Due:      Topic Date Due   • Pneumococcal Vaccine: 50+ Years (3 of 3 - PPSV23, PCV20 or PCV21) 08/17/2021   • COVID-19 Vaccine (7 - Moderna risk 2024-25 season) 07/03/2025   • Influenza Vaccine (1) 09/01/2025     Advance Directives   What are advance directives?  Advance directives are legal documents that state your wishes and plans for medical care. These plans are made ahead of time in case you lose your ability to make decisions for yourself. Advance directives can apply to any medical decision, such as the treatments you want, and if you want to donate organs.   What are the types of advance directives?  There are many types of advance directives, and each state has rules about how to use them. You may choose a combination of any of the following:  Living will:  This is a written record of the treatment you want. You can also choose which treatments you do not want, which to limit, and which to stop at a certain time. This includes surgery, medicine, IV fluid, and tube feedings.   Durable power of  for healthcare (DPAHC):  This is a written record that states who you want to make healthcare choices for you when you are unable to make them for yourself. This person, called a proxy, is usually a family member or a friend. You may choose more than 1 proxy.  Do not resuscitate (DNR) order:  A DNR order is used in case your heart stops beating or you stop breathing. It is a request not to have certain forms of treatment, such as CPR. A DNR order may be included in other types of advance directives.  Medical directive:  This covers the care that you want if you are in a coma, near death, or unable to make decisions for yourself. You can list the treatments you want for each condition. Treatment may include pain medicine, surgery, blood transfusions, dialysis, IV or tube feedings, and a ventilator (breathing machine).  Values history:  This document has questions about your views, beliefs,  and how you feel and think about life. This information can help others choose the care that you would choose.  Why are advance directives important?  An advance directive helps you control your care. Although spoken wishes may be used, it is better to have your wishes written down. Spoken wishes can be misunderstood, or not followed. Treatments may be given even if you do not want them. An advance directive may make it easier for your family to make difficult choices about your care.   Narcotic (Opioid) Safety    Use narcotics safely:  Take prescribed narcotics exactly as directed  Do not give narcotics to others or take narcotics that belong to someone else  Do not mix narcotics without medicines or alcohol  Do not drive or operate heavy machinery after you take the narcotic  Monitor for side effects and notify your healthcare provider if you experienced side effects such as nausea, sleepiness, itching, or trouble thinking clearly.    Manage constipation:    Constipation is the most common side effect of narcotic medicine. Constipation is when you have hard, dry bowel movements, or you go longer than usual between bowel movements. Tell your healthcare provider about all changes in your bowel movements while you are taking narcotics. He or she may recommend laxative medicine to help you have a bowel movement. He or she may also change the kind of narcotic you are taking, or change when you take it. The following are more ways you can prevent or relieve constipation:    Drink liquids as directed.  You may need to drink extra liquids to help soften and move your bowels. Ask how much liquid to drink each day and which liquids are best for you.  Eat high-fiber foods.  This may help decrease constipation by adding bulk to your bowel movements. High-fiber foods include fruits, vegetables, whole-grain breads and cereals, and beans. Your healthcare provider or dietitian can help you create a high-fiber meal plan. Your  provider may also recommend a fiber supplement if you cannot get enough fiber from food.  Exercise regularly.  Regular physical activity can help stimulate your intestines. Walking is a good exercise to prevent or relieve constipation. Ask which exercises are best for you.  Schedule a time each day to have a bowel movement.  This may help train your body to have regular bowel movements. Bend forward while you are on the toilet to help move the bowel movement out. Sit on the toilet for at least 10 minutes, even if you do not have a bowel movement.    Store narcotics safely:   Store narcotics where others cannot easily get them.  Keep them in a locked cabinet or secure area. Do not  keep them in a purse or other bag you carry with you. A person may be looking for something else and find the narcotics.  Make sure narcotics are stored out of the reach of children.  A child can easily overdose on narcotics. Narcotics may look like candy to a small child.    The best way to dispose of narcotics:      The laws vary by country and area. In the [place], the best way is to return the narcotics through a take-back program. This program is offered by the [place] Drug Enforcement Agency (PHONG). The following are options for using the program:  Take the narcotics to a PHONG collection site.  The site is often a law enforcement center. Call your local law enforcement center for scheduled take-back days in your area. You will be given information on where to go if the collection site is in a different location.  Take the narcotics to an approved pharmacy or hospital.  A pharmacy or hospital may be set up as a collection site. You will need to ask if it is a PHONG collection site if you were not directed there. A pharmacy or doctor's office may not be able to take back narcotics unless it is a PHONG site.  Use a mail-back system.  This means you are given containers to put the narcotics into. You will then mail them in the  containers.  Use a take-back drop box.  This is a place to leave the narcotics at any time. People and animals will not be able to get into the box. Your local law enforcement agency can tell you where to find a drop box in your area.    Other ways to manage pain:   Ask your healthcare provider about non-narcotic medicines to control pain.  Nonprescription medicines include NSAIDs (such as ibuprofen) and acetaminophen. Prescription medicines include muscle relaxers, antidepressants, and steroids.  Pain may be managed without any medicines.  Some ways to relieve pain include massage, aromatherapy, or meditation. Physical or occupational therapy may also help.    For more information:   Drug Enforcement Administration  [address] [place] [address]  Phone: 4- 040 - 698-1815  Web Address: https://www.deadiversion.[facility].gov/drug_disposal/     Food and Drug Administration  [address] [place] [address]  Phone: 7- 368 - 381-8599  Web Address: http://www.fda.gov   © Copyright [employer] 2018 Information is for End User's use only and may not be sold, redistributed or otherwise used for commercial purposes. All illustrations and images included in CareNotes® are the copyrighted property of [employer]., [employer] or [employer]

## (undated) DEVICE — STOCKINETTE REGULAR

## (undated) DEVICE — PENCIL ELECTROSURG E-Z CLEAN -0035H

## (undated) DEVICE — GLOVE SRG BIOGEL ECLIPSE 7.5

## (undated) DEVICE — CAST PADDING 4 IN SYNTHETIC NON-STRL

## (undated) DEVICE — BETHLEHEM UNIVERSAL  MIONR EXT: Brand: CARDINAL HEALTH

## (undated) DEVICE — GLOVE SRG BIOGEL ECLIPSE 8

## (undated) DEVICE — ABDOMINAL PAD: Brand: DERMACEA

## (undated) DEVICE — SYRINGE 3ML LL

## (undated) DEVICE — GLOVE INDICATOR PI UNDERGLOVE SZ 6.5 BLUE

## (undated) DEVICE — STRETCH BANDAGE: Brand: CURITY

## (undated) DEVICE — GLOVE SRG BIOGEL 6.5

## (undated) DEVICE — CURITY NON-ADHERENT STRIPS: Brand: CURITY

## (undated) DEVICE — NEEDLE 18 G X 1 1/2

## (undated) DEVICE — CHLORAPREP HI-LITE 26ML ORANGE

## (undated) DEVICE — GLOVE INDICATOR PI UNDERGLOVE SZ 8 BLUE

## (undated) DEVICE — CUFF TOURNIQUET 18 X 4 IN QUICK CONNECT DISP 1 BLADDER

## (undated) DEVICE — GLOVE INDICATOR PI UNDERGLOVE SZ 7.5 BLUE

## (undated) DEVICE — GLOVE INDICATOR PI UNDERGLOVE SZ 7 BLUE

## (undated) DEVICE — COBAN 4 IN STERILE

## (undated) DEVICE — SYRINGE 10ML LL

## (undated) DEVICE — PAD CAST 4 IN COTTON NON STERILE

## (undated) DEVICE — NEEDLE 25G X 1 1/2

## (undated) DEVICE — 2000CC GUARDIAN II: Brand: GUARDIAN

## (undated) DEVICE — PREP PAD BNS: Brand: CONVERTORS

## (undated) DEVICE — INTENDED FOR TISSUE SEPARATION, AND OTHER PROCEDURES THAT REQUIRE A SHARP SURGICAL BLADE TO PUNCTURE OR CUT.: Brand: BARD-PARKER ® CARBON RIB-BACK BLADES

## (undated) DEVICE — GLOVE SRG BIOGEL ECLIPSE 6

## (undated) DEVICE — SCD SEQUENTIAL COMPRESSION COMFORT SLEEVE MEDIUM KNEE LENGTH: Brand: KENDALL SCD